# Patient Record
Sex: FEMALE | Race: WHITE | NOT HISPANIC OR LATINO | Employment: OTHER | URBAN - METROPOLITAN AREA
[De-identification: names, ages, dates, MRNs, and addresses within clinical notes are randomized per-mention and may not be internally consistent; named-entity substitution may affect disease eponyms.]

---

## 2021-04-08 DIAGNOSIS — Z23 ENCOUNTER FOR IMMUNIZATION: ICD-10-CM

## 2022-06-19 ENCOUNTER — APPOINTMENT (EMERGENCY)
Dept: RADIOLOGY | Facility: HOSPITAL | Age: 72
DRG: 563 | End: 2022-06-19
Payer: COMMERCIAL

## 2022-06-19 ENCOUNTER — HOSPITAL ENCOUNTER (INPATIENT)
Facility: HOSPITAL | Age: 72
LOS: 1 days | Discharge: HOME | DRG: 563 | End: 2022-06-21
Attending: SURGERY | Admitting: SURGERY
Payer: COMMERCIAL
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
## 2022-06-19 DIAGNOSIS — S42.292A FRACTURE OF HUMERAL HEAD, CLOSED, LEFT, INITIAL ENCOUNTER: Primary | ICD-10-CM

## 2022-06-19 DIAGNOSIS — R93.3 ABNORMAL CT SCAN, STOMACH: ICD-10-CM

## 2022-06-19 DIAGNOSIS — R93.89 ABNORMAL CT SCAN: ICD-10-CM

## 2022-06-19 LAB
ABO GROUP BLD: NORMAL
ABO GROUP BLD: NORMAL
ANION GAP SERPL CALCULATED.3IONS-SCNC: 5 MMOL/L (ref 4–13)
APTT PPP: 24 SECONDS (ref 23–37)
BASOPHILS # BLD AUTO: 0.05 THOUSANDS/ΜL (ref 0–0.1)
BASOPHILS NFR BLD AUTO: 0 % (ref 0–1)
BLD GP AB SCN SERPL QL: NEGATIVE
BUN SERPL-MCNC: 17 MG/DL (ref 5–25)
CALCIUM SERPL-MCNC: 8.2 MG/DL (ref 8.3–10.1)
CFFMA (FUNCTIONAL FIBRINOGEN MAX AMPLITUDE): 32.6 MM (ref 15–32)
CHLORIDE SERPL-SCNC: 108 MMOL/L (ref 100–108)
CK MB SERPL-MCNC: 1.5 % (ref 0–2.5)
CK MB SERPL-MCNC: 2.8 NG/ML (ref 0–5)
CK SERPL-CCNC: 184 U/L (ref 26–192)
CKLY30: 1 % (ref 0–2.6)
CKR(REACTION TIME): 6.2 MIN (ref 4.6–9.1)
CO2 SERPL-SCNC: 28 MMOL/L (ref 21–32)
CREAT SERPL-MCNC: 0.69 MG/DL (ref 0.6–1.3)
CRTMA(RAPIDTEG MAX AMPLITUDE): 68.1 MM (ref 52–70)
EOSINOPHIL # BLD AUTO: 0.01 THOUSAND/ΜL (ref 0–0.61)
EOSINOPHIL NFR BLD AUTO: 0 % (ref 0–6)
ERYTHROCYTE [DISTWIDTH] IN BLOOD BY AUTOMATED COUNT: 12 % (ref 11.6–15.1)
GFR SERPL CREATININE-BSD FRML MDRD: 89 ML/MIN/1.73SQ M
GLUCOSE SERPL-MCNC: 121 MG/DL (ref 65–140)
HCT VFR BLD AUTO: 41.5 % (ref 34.8–46.1)
HGB BLD-MCNC: 13.4 G/DL (ref 11.5–15.4)
HOLD SPECIMEN: NORMAL
IMM GRANULOCYTES # BLD AUTO: 0.15 THOUSAND/UL (ref 0–0.2)
IMM GRANULOCYTES NFR BLD AUTO: 1 % (ref 0–2)
INR PPP: 1.02 (ref 0.84–1.19)
LYMPHOCYTES # BLD AUTO: 1.55 THOUSANDS/ΜL (ref 0.6–4.47)
LYMPHOCYTES NFR BLD AUTO: 9 % (ref 14–44)
MCH RBC QN AUTO: 30.7 PG (ref 26.8–34.3)
MCHC RBC AUTO-ENTMCNC: 32.3 G/DL (ref 31.4–37.4)
MCV RBC AUTO: 95 FL (ref 82–98)
MONOCYTES # BLD AUTO: 0.79 THOUSAND/ΜL (ref 0.17–1.22)
MONOCYTES NFR BLD AUTO: 5 % (ref 4–12)
NEUTROPHILS # BLD AUTO: 14.66 THOUSANDS/ΜL (ref 1.85–7.62)
NEUTS SEG NFR BLD AUTO: 85 % (ref 43–75)
NRBC BLD AUTO-RTO: 0 /100 WBCS
PLATELET # BLD AUTO: 281 THOUSANDS/UL (ref 149–390)
PMV BLD AUTO: 10.5 FL (ref 8.9–12.7)
POTASSIUM SERPL-SCNC: 3.5 MMOL/L (ref 3.5–5.3)
PROTHROMBIN TIME: 13 SECONDS (ref 11.6–14.5)
RBC # BLD AUTO: 4.36 MILLION/UL (ref 3.81–5.12)
RH BLD: POSITIVE
RH BLD: POSITIVE
SODIUM SERPL-SCNC: 141 MMOL/L (ref 136–145)
SPECIMEN EXPIRATION DATE: NORMAL
WBC # BLD AUTO: 17.21 THOUSAND/UL (ref 4.31–10.16)

## 2022-06-19 PROCEDURE — 73030 X-RAY EXAM OF SHOULDER: CPT

## 2022-06-19 PROCEDURE — 36415 COLL VENOUS BLD VENIPUNCTURE: CPT | Performed by: EMERGENCY MEDICINE

## 2022-06-19 PROCEDURE — 80048 BASIC METABOLIC PNL TOTAL CA: CPT | Performed by: SURGERY

## 2022-06-19 PROCEDURE — 99205 OFFICE O/P NEW HI 60 MIN: CPT | Performed by: SURGERY

## 2022-06-19 PROCEDURE — 85610 PROTHROMBIN TIME: CPT | Performed by: SURGERY

## 2022-06-19 PROCEDURE — 90715 TDAP VACCINE 7 YRS/> IM: CPT | Performed by: EMERGENCY MEDICINE

## 2022-06-19 PROCEDURE — 99285 EMERGENCY DEPT VISIT HI MDM: CPT

## 2022-06-19 PROCEDURE — 82553 CREATINE MB FRACTION: CPT | Performed by: EMERGENCY MEDICINE

## 2022-06-19 PROCEDURE — NC001 PR NO CHARGE: Performed by: EMERGENCY MEDICINE

## 2022-06-19 PROCEDURE — 85397 CLOTTING FUNCT ACTIVITY: CPT | Performed by: SURGERY

## 2022-06-19 PROCEDURE — 86900 BLOOD TYPING SEROLOGIC ABO: CPT | Performed by: SURGERY

## 2022-06-19 PROCEDURE — 72125 CT NECK SPINE W/O DYE: CPT

## 2022-06-19 PROCEDURE — 96374 THER/PROPH/DIAG INJ IV PUSH: CPT

## 2022-06-19 PROCEDURE — 86850 RBC ANTIBODY SCREEN: CPT | Performed by: SURGERY

## 2022-06-19 PROCEDURE — 74177 CT ABD & PELVIS W/CONTRAST: CPT

## 2022-06-19 PROCEDURE — 85730 THROMBOPLASTIN TIME PARTIAL: CPT | Performed by: SURGERY

## 2022-06-19 PROCEDURE — 85576 BLOOD PLATELET AGGREGATION: CPT | Performed by: SURGERY

## 2022-06-19 PROCEDURE — 71260 CT THORAX DX C+: CPT

## 2022-06-19 PROCEDURE — 86901 BLOOD TYPING SEROLOGIC RH(D): CPT | Performed by: SURGERY

## 2022-06-19 PROCEDURE — 85347 COAGULATION TIME ACTIVATED: CPT | Performed by: SURGERY

## 2022-06-19 PROCEDURE — 85384 FIBRINOGEN ACTIVITY: CPT | Performed by: SURGERY

## 2022-06-19 PROCEDURE — 82550 ASSAY OF CK (CPK): CPT | Performed by: EMERGENCY MEDICINE

## 2022-06-19 PROCEDURE — 93308 TTE F-UP OR LMTD: CPT | Performed by: SURGERY

## 2022-06-19 PROCEDURE — 85025 COMPLETE CBC W/AUTO DIFF WBC: CPT | Performed by: SURGERY

## 2022-06-19 PROCEDURE — 76705 ECHO EXAM OF ABDOMEN: CPT | Performed by: SURGERY

## 2022-06-19 PROCEDURE — 70450 CT HEAD/BRAIN W/O DYE: CPT

## 2022-06-19 RX ORDER — OXYCODONE HYDROCHLORIDE 5 MG/1
5 TABLET ORAL EVERY 4 HOURS PRN
Status: DISCONTINUED | OUTPATIENT
Start: 2022-06-19 | End: 2022-06-21 | Stop reason: HOSPADM

## 2022-06-19 RX ORDER — POLYETHYLENE GLYCOL 3350 17 G/17G
17 POWDER, FOR SOLUTION ORAL DAILY PRN
Status: DISCONTINUED | OUTPATIENT
Start: 2022-06-19 | End: 2022-06-21 | Stop reason: HOSPADM

## 2022-06-19 RX ORDER — LIDOCAINE 50 MG/G
1 PATCH TOPICAL ONCE
Status: DISCONTINUED | OUTPATIENT
Start: 2022-06-19 | End: 2022-06-21 | Stop reason: HOSPADM

## 2022-06-19 RX ORDER — DOCUSATE SODIUM 100 MG/1
100 CAPSULE, LIQUID FILLED ORAL 2 TIMES DAILY
Status: DISCONTINUED | OUTPATIENT
Start: 2022-06-19 | End: 2022-06-21 | Stop reason: HOSPADM

## 2022-06-19 RX ORDER — AMOXICILLIN 250 MG
1 CAPSULE ORAL
Status: DISCONTINUED | OUTPATIENT
Start: 2022-06-19 | End: 2022-06-21 | Stop reason: HOSPADM

## 2022-06-19 RX ORDER — HYDROMORPHONE HCL IN WATER/PF 6 MG/30 ML
0.2 PATIENT CONTROLLED ANALGESIA SYRINGE INTRAVENOUS EVERY 4 HOURS PRN
Status: DISCONTINUED | OUTPATIENT
Start: 2022-06-19 | End: 2022-06-21 | Stop reason: HOSPADM

## 2022-06-19 RX ORDER — ACETAMINOPHEN 325 MG/1
975 TABLET ORAL EVERY 8 HOURS SCHEDULED
Status: DISCONTINUED | OUTPATIENT
Start: 2022-06-19 | End: 2022-06-21 | Stop reason: HOSPADM

## 2022-06-19 RX ORDER — ONDANSETRON 2 MG/ML
INJECTION INTRAMUSCULAR; INTRAVENOUS CODE/TRAUMA/SEDATION MEDICATION
Status: COMPLETED | OUTPATIENT
Start: 2022-06-19 | End: 2022-06-19

## 2022-06-19 RX ORDER — OXYCODONE HYDROCHLORIDE 5 MG/1
2.5 TABLET ORAL EVERY 4 HOURS PRN
Status: DISCONTINUED | OUTPATIENT
Start: 2022-06-19 | End: 2022-06-21 | Stop reason: HOSPADM

## 2022-06-19 RX ORDER — FENTANYL CITRATE 50 UG/ML
3 INJECTION, SOLUTION INTRAMUSCULAR; INTRAVENOUS ONCE
Status: COMPLETED | OUTPATIENT
Start: 2022-06-19 | End: 2022-06-19

## 2022-06-19 RX ORDER — ONDANSETRON 2 MG/ML
4 INJECTION INTRAMUSCULAR; INTRAVENOUS EVERY 4 HOURS PRN
Status: DISCONTINUED | OUTPATIENT
Start: 2022-06-19 | End: 2022-06-21 | Stop reason: HOSPADM

## 2022-06-19 RX ADMIN — TETANUS TOXOID, REDUCED DIPHTHERIA TOXOID AND ACELLULAR PERTUSSIS VACCINE, ADSORBED 0.5 ML: 5; 2.5; 8; 8; 2.5 SUSPENSION INTRAMUSCULAR at 17:33

## 2022-06-19 RX ADMIN — ACETAMINOPHEN 975 MG: 325 TABLET, FILM COATED ORAL at 17:33

## 2022-06-19 RX ADMIN — ONDANSETRON 4 MG: 2 INJECTION INTRAMUSCULAR; INTRAVENOUS at 08:23

## 2022-06-19 RX ADMIN — IOHEXOL 65 ML: 350 INJECTION, SOLUTION INTRAVENOUS at 08:37

## 2022-06-19 RX ADMIN — DOCUSATE SODIUM 100 MG: 100 CAPSULE, LIQUID FILLED ORAL at 11:25

## 2022-06-19 RX ADMIN — HYDROMORPHONE HYDROCHLORIDE 0.2 MG: 0.2 INJECTION, SOLUTION INTRAMUSCULAR; INTRAVENOUS; SUBCUTANEOUS at 12:11

## 2022-06-19 RX ADMIN — HYDROMORPHONE HYDROCHLORIDE 0.2 MG: 0.2 INJECTION, SOLUTION INTRAMUSCULAR; INTRAVENOUS; SUBCUTANEOUS at 19:49

## 2022-06-19 RX ADMIN — OXYCODONE HYDROCHLORIDE 5 MG: 5 TABLET ORAL at 17:33

## 2022-06-19 RX ADMIN — OXYCODONE HYDROCHLORIDE 5 MG: 5 TABLET ORAL at 11:25

## 2022-06-19 NOTE — QUICK NOTE
Chart reviewed  Patient admitted as a trauma after fall  GI consulted for abnormal CT scan showing fluid filled and distended esophagus and stomach  Will discuss with patient tomorrow regarding possible EGD for further evaluation  Keep NPO at midnight  Full consultation tomorrow

## 2022-06-19 NOTE — ED PROCEDURE NOTE
Procedure  POC AAA US    Date/Time: 6/19/2022 1:38 PM  Performed by: Patrick Moreno MD  Authorized by: Patrick Moreno MD     Patient location:  ED  Performing Provider:  Resident  Other Assisting Provider: No    Procedure details:     Exam Type:  Educational    Image quality: limited diagnostic      Image availability:  Images available in PACS and still images obtained  Findings:     Abdominal Aorta Findings: limited visualization of suprarenal aorta and limited visualization of infrarenal aorta    Interpretation:     Aortic ultrasound impression: indeterminate    Comments:      Significant bowel gas, difficult visualization                     Patrick Moreno MD  06/19/22 1753

## 2022-06-19 NOTE — ED PROVIDER NOTES
Emergency Department Airway Evaluation and Management Form    History  Obtained from: ems and patient  Patient has no allergy information on record  No chief complaint on file  HPI   Left dog out last night got knocked over and pt was on ground outside all night Pt is on eloquis  Pt co L shoulder pain   No past medical history on file  No past surgical history on file  No family history on file  I have reviewed and agree with the history as documented      Review of Systems  Unable secondary to acuity  Physical Exam  /60   Pulse 92   Temp 97 5 °F (36 4 °C) (Oral)   Resp (!) 0   SpO2 96%     Physical Exam  Alert nad   gcs 15 neck in collar  L shoulder tender not moving good pulses  abd nontender   Moving all other ext except L upper   ED Medications  Medications   ondansetron (ZOFRAN) injection (4 mg Intravenous Given 6/19/22 0823)   fentanyl citrate (PF) (FOR EMS ONLY) 100 mcg/2 mL injection 300 mcg (0 mcg Does not apply Given to EMS 6/19/22 0826)       Intubation  Procedures    Notes  Pt placed on 4 L of O2    Final Diagnosis  Final diagnoses:   None    fall on eloquis   L shoulder injury    ED Provider  Electronically Signed by     Josefa Gutierrez MD  06/19/22 3271

## 2022-06-19 NOTE — H&P
H&P - Trauma   Fermin Woo 73 y o  female MRN: [id number]  Unit/Bed#: [facility] Encounter: [id number]    Trauma Alert: Level B   Model of Arrival: Ambulance    Trauma Team: Attending Paulo Irizarry and Residents Clem Cheung  Consultants:     Orthopedics: routine consult; Epic consult order placed; Assessment/Plan   Active Problems / Assessment:   Comminuted proximal left humeral fracture  Distended, fluid filled stomach with reflux  Ground level fall, on Eloquis     Plan:   Trauma admission  Ortho consult  GI consult  Pain control  PT/OT   Tertiary within 24 hours    History of Present Illness     Chief Complaint: Fall  Mechanism:Fall     HPI:    Fermin Woo is a 73 y o  female who presents as a level B trauma after a fall  Patient is on eloquis for PE  Patient states that around midnight, she went to let her dog out, her dog was "spooked" by a racoon, pulled her forwards and she fell, landing on her L shoulder  Due to pain, she was unable to get up and was on the ground for approximately 7 hours  Per EMS, she was found to be 94 degrees F temporal  At time of evaluation, patient states that she feels very cold and is complaining of L shoulder pain  Initial temp was 97 degrees oral      Review of Systems   Constitutional: Negative for chills and fever  Respiratory: Negative for shortness of breath  Cardiovascular: Negative for chest pain  Genitourinary: Negative for flank pain  Musculoskeletal: Positive for gait problem  Negative for back pain and neck pain  Neurological: Negative for dizziness, weakness, light-headedness, numbness and headaches  12-point, complete review of systems was reviewed and negative except as stated above  Historical Information     No past medical history on file  No past surgical history on file  There is no immunization history on file for this patient  Last Tetanus: Given today 6/19/2022  Family History: Non-contributory    1   Before the illness or injury that brought you to the Emergency, did you need someone to help you on a regular basis? 0=No   2  Since the illness or injury that brought you to the Emergency, have you needed more help than usual to take care of yourself? 0=No   3  Have you been hospitalized for one or more nights during the past 6 months (excluding a stay in the Emergency Department)? 0=No   4  In general, do you see well? 0=Yes   5  In general, do you have serious problems with your memory? 0=No   6  Do you take more than three different medications everyday?  0=No   TOTAL   0     Did you order a geriatric consult if the score was 2 or greater?: n/a     Meds/Allergies   all current active meds have been reviewed and current meds:   Current Facility-Administered Medications   Medication Dose Route Frequency    acetaminophen (TYLENOL) tablet 975 mg  975 mg Oral Q8H [facility]    docusate sodium (COLACE) capsule 100 mg  100 mg Oral BID    HYDROmorphone HCl (DILAUDID) injection 0 2 mg  0 2 mg Intravenous Q4H PRN    naloxone (NARCAN) 0 04 mg/mL syringe 0 04 mg  0 04 mg Intravenous Q1MIN PRN    ondansetron (ZOFRAN) injection 4 mg  4 mg Intravenous Q4H PRN    oxyCODONE (ROXICODONE) IR tablet 2 5 mg  2 5 mg Oral Q4H PRN    oxyCODONE (ROXICODONE) IR tablet 5 mg  5 mg Oral Q4H PRN    polyethylene glycol (MIRALAX) packet 17 g  17 g Oral Daily PRN    senna-docusate sodium (SENOKOT S) 8 6-50 mg per tablet 1 tablet  1 tablet Oral HS      No Known Allergies    Objective   Initial Vitals:   Temperature: 97 5 °F (36 4 °C) (06/19/22 0815)  Pulse: 90 (06/19/22 0815)  Respirations: 16 (06/19/22 0815)  Blood Pressure: 130/60 (06/19/22 0815)    Primary Survey:   Airway:        Status: patent;        Pre-hospital Interventions: none        Hospital Interventions: none  Breathing:        Pre-hospital Interventions: oxygen       Effort: normal       Right breath sounds: normal       Left breath sounds: normal  Circulation:        Rhythm: regular       Rate: regular   Right Pulses Left Pulses    R radial: 2+  R femoral: 2+  R pedal: 2+     L radial: 2+  L femoral: 2+  L pedal: 2+       Disability:        GCS: Eye: 4; Verbal: 5 Motor: 6 Total: 15       Right Pupil: 4 mm;  round;  reactive         Left Pupil:  4 mm;  round;  reactive      R Motor Strength L Motor Strength    R : 5/5  R dorsiflex: 5/5  R plantarflex: 5/5 L : 5/5  L dorsiflex: 5/5  L plantarflex: 5/5        Sensory:  No sensory deficit  Exposure:       Completed: Yes      Secondary Survey:  Physical Exam  Vitals and nursing note reviewed  Constitutional:       General: She is not in acute distress  Appearance: She is not ill-appearing or toxic-appearing  HENT:      Head: Normocephalic and atraumatic  Right Ear: Tympanic membrane, ear canal and external ear normal       Left Ear: Tympanic membrane, ear canal and external ear normal       Nose: Nose normal       Mouth/Throat:      Mouth: Mucous membranes are moist       Pharynx: Oropharynx is clear  Eyes:      Extraocular Movements: Extraocular movements intact  Pupils: Pupils are equal, round, and reactive to light  Neck:      Comments: No midline C/T/L spine tenderness  Cardiovascular:      Rate and Rhythm: Normal rate and regular rhythm  Pulses: Normal pulses  Heart sounds: Normal heart sounds  Pulmonary:      Effort: Pulmonary effort is normal  No respiratory distress  Breath sounds: Normal breath sounds  Abdominal:      General: Abdomen is flat  Palpations: Abdomen is soft  Tenderness: There is no abdominal tenderness  There is no guarding or rebound  Musculoskeletal:         General: Tenderness (L shoulder) present  No deformity  Cervical back: Normal range of motion and neck supple  Comments: LUE held in abduction and internal rotation  NVI  Skin:     General: Skin is cool  Capillary Refill: Capillary refill takes less than 2 seconds     Neurological:      General: No focal deficit present  Mental Status: She is alert and oriented to person, place, and time  Invasive Devices  Report    Peripheral Intravenous Line  Duration           Peripheral IV Right Antecubital -- days    Peripheral IV Right Forearm -- days              Lab Results:   Results: I have personally reviewed all pertinent laboratory/tests results, BMP/CMP:   Lab Results   Component Value Date    SODIUM 141 06/19/2022    K 3 5 06/19/2022     06/19/2022    CO2 28 06/19/2022    BUN 17 06/19/2022    CREATININE 0 69 06/19/2022    CALCIUM 8 2 (L) 06/19/2022    EGFR 89 06/19/2022   , CBC:   Lab Results   Component Value Date    WBC 17 21 (H) 06/19/2022    HGB 13 4 06/19/2022    HCT 41 5 06/19/2022    MCV 95 06/19/2022     06/19/2022    MCH 30 7 06/19/2022    MCHC 32 3 06/19/2022    RDW 12 0 06/19/2022    MPV 10 5 06/19/2022    NRBC 0 06/19/2022    and CK:   Lab Results   Component Value Date    CKTOTAL 184 06/19/2022       Imaging Results: I have personally reviewed pertinent reports  and I have personally reviewed pertinent films in PACS  Chest Xray(s): positive for acute findings: Possible consolidation on L on my interpretation   FAST exam(s): negative for acute findings   CT Scan(s): positive for acute findings:   1  Displaced, comminuted proximal left humeral fracture  Recommend follow-up orthopedic surgery consultation      2  Distended, fluid-filled stomach with reflux into a dilated, abnormal appearing esophagus, up to the level of the thoracic inlet  Correlate for gastroparesis and achalasia  Patient is at increased risk for aspiration  Recommend follow-up GI   consultation      3  No traumatic solid or visceral organ injury      4   Mildly complex exophytic cyst upper pole left kidney  Follow-up renal ultrasound is recommended     Additional Xray(s): pending     Other Studies:     Code Status: No Order  Advance Directive and Living Will:      Power of :    POLST:

## 2022-06-19 NOTE — QUICK NOTE
Cervical Collar Clearance: The patient had a CT scan of the cervical spine demonstrating no acute injury  On exam, the patient had no midline point tenderness or paresthesias/numbness/weakness in the extremities  The patient had full range of motion (was then able to flex, extend, and rotate head laterally) without pain  There were no distracting injuries and the patient was not intoxicated  The patient's cervical spine was cleared radiologically and clinically  Cervical collar removed at this time       Mirlande Singletary DO  6/19/2022 10:46 AM

## 2022-06-19 NOTE — PROCEDURES
POC FAST US    Date/Time: 6/19/2022 8:59 AM  Performed by: Kandace Vanegas DO  Authorized by: Kandace Vanegas DO     Patient location:  Trauma  Procedure details:     Exam Type:  Diagnostic    Indications: blunt abdominal trauma and blunt chest trauma      Assess for:  Intra-abdominal fluid, pericardial effusion and pneumothorax    Technique: FAST      Views obtained:  Heart - Pericardial sac, RUQ - Fernandez's Pouch, LUQ - Splenorenal space and Suprapubic - Pouch of [place]    Image availability:  Images available in PACS  FAST Findings:     RUQ (Hepatorenal) free fluid: absent      LUQ (Splenorenal) free fluid: absent      Suprapubic free fluid: absent      Cardiac wall motion: identified      Pericardial effusion: absent    Interpretation:     Impressions: negative

## 2022-06-19 NOTE — CONSULTS
Orthopedics   Tarsunil Batters 73 y o  female MRN: [id number]  Unit/Bed#: [facility] 3      Chief Complaint:   left arm and shoulder pain    HPI:   73 y o  right hand dominant female who presented to [facility] this morning as a level B trauma after being found down by her neighbor this morning  She is complaining of left shoulder pain  She reports she had consumed alcohol last night and was letting her dog outside when the dog pulled her to the ground after chasing a racoon  She reports immediate pain to the left shoulder with an inability to get off the floor  She reports she lost consciousness but does not remember striking her head  She denies numbness or tingling to her left upper extremity  She denies any other complaints  She take Eliquis for a previous pulmonary embolism  Review Of Systems:   · Skin: Normal  · Neuro: See HPI  · Musculoskeletal: See HPI  · 14 point review of systems negative except as stated above     Past Medical History:   No past medical history on file  Past Surgical History:   No past surgical history on file  Family History:  Family history reviewed and non-contributory  No family history on file      Social History:  Social History     Socioeconomic History    Marital status: /Civil Union     Spouse name: Not on file    Number of children: Not on file    Years of education: Not on file    Highest education level: Not on file   Occupational History    Not on file   Tobacco Use    Smoking status: Not on file    Smokeless tobacco: Not on file   Substance and Sexual Activity    Alcohol use: Not on file    Drug use: Not on file    Sexual activity: Not on file   Other Topics Concern    Not on file   Social History Narrative    Not on file     Social Determinants of Health     Financial Resource Strain: Not on file   Food Insecurity: Not on file   Transportation Needs: Not on file   Physical Activity: Not on file   Stress: Not on file   Social Connections: Not on file Intimate Partner Violence: Not on file   Housing Stability: Not on file       Allergies:   No Known Allergies        Labs:  0   Lab Value Date/Time    HCT 41 5 06/19/2022 0822    HGB 13 4 06/19/2022 0822    INR 1 02 06/19/2022 0822    WBC 17 21 (H) 06/19/2022 0822       Meds:  No current facility-administered medications for this encounter  No current outpatient medications on file  Blood Culture:   No results found for: BLOODCX    Wound Culture:   No results found for: WOUNDCULT    Ins and Outs:  No intake/output data recorded  Physical Exam:   /57   Pulse 66   Temp 97 5 °F (36 4 °C) (Oral)   Resp 16   Wt 98 3 kg (216 lb 11 4 oz)   SpO2 97%   Gen: Alert and oriented to person, place, time  HEENT: EOMI, eyes clear, moist mucus membranes, hearing intact  Respiratory: Bilateral chest rise  No audible wheezing found  Cardiovascular: Regular Rate and Rhythm  Abdomen: soft nontender/nondistended  Musculoskeletal: left upper extremity  · Skin intact and swelling noted over the shoulder  · Tender to palpation over shoulder and upper arm  · Sensation intact to radial, ulna, median, musculocutaneous, and axillary nerve distributions  · Motor intact to  radial, ulna, median, musculocutaneous, and axillary nerve distributions  · 2+ rad pulse  · Left hand rings were removed upon exam     Radiology:   I personally reviewed the films  X-rays left humerus shows left proximal humerus fracture with glenohumeral location on the view that was obtained by the trauma team    CT scan of left upper extremity demonstrates comminuted proximal humerus fracture with [address] joint location      _*_*_*_*_*_*_*_*_*_*_*_*_*_*_*_*_*_*_*_*_*_*_*_*_*_*_*_*_*_*_*_*_*_*_*_*_*_*_*_*_*    Assessment:  73 y o  female S/P ground level with left proximal humerus fracture   The patient is neurovascularly intact and this injury can be managed nonoperatively at this time with follow up outpatient to discuss surgical options such as a rTSA or ORIF  Plan:   · NWB left upper extremity  Sling placed  · Analgesics for pain  · Will obtain new shoulder x-rays  · There is no height or weight on file to calculate BMI  · Recommend behavior modifications, nutrition and physical activity  · Dispo: Ortho will follow while admitted to trauma     · Patient can follow up outpatient with Dr Matteo Dimas   · Case seen in conjunction with senior resident on call      Daiana Fuentes MD

## 2022-06-19 NOTE — PROGRESS NOTES
Spiritual Care Progress Note    2022  Patient: Dolores See : 1950  Admission Date & Time: 2022 0814  MRN: [id number] [facility]: [id number]       responded to trauma alert per protocol   met with patient's son (first name Kaur Carbajal?) in family waiting room, provided support and oriented pt's son to hospital  Pt's son expressed gratitude, noted that patient had been walking his dog  Son reported pt's daughter Paul Mckeon en route to hospital      Spiritual care will remain available as needed  22 0800   Clinical Encounter Type   Visited With Family; Health care provider   Routine Visit Introduction   Crisis Visit ED

## 2022-06-20 ENCOUNTER — APPOINTMENT (INPATIENT)
Dept: RADIOLOGY | Facility: HOSPITAL | Age: 72
DRG: 563 | End: 2022-06-20
Payer: COMMERCIAL

## 2022-06-20 PROBLEM — S42.309A HUMERAL FRACTURE: Status: ACTIVE | Noted: 2022-06-20

## 2022-06-20 PROBLEM — R93.3 ABNORMAL CT SCAN, STOMACH: Status: ACTIVE | Noted: 2022-06-20

## 2022-06-20 PROBLEM — Z86.711 HISTORY OF PULMONARY EMBOLISM: Status: ACTIVE | Noted: 2022-06-20

## 2022-06-20 LAB
ANION GAP SERPL CALCULATED.3IONS-SCNC: 2 MMOL/L (ref 4–13)
BASOPHILS # BLD AUTO: 0.05 THOUSANDS/ΜL (ref 0–0.1)
BASOPHILS NFR BLD AUTO: 0 % (ref 0–1)
BUN SERPL-MCNC: 16 MG/DL (ref 5–25)
CALCIUM SERPL-MCNC: 8.9 MG/DL (ref 8.3–10.1)
CHLORIDE SERPL-SCNC: 105 MMOL/L (ref 100–108)
CO2 SERPL-SCNC: 28 MMOL/L (ref 21–32)
CREAT SERPL-MCNC: 0.78 MG/DL (ref 0.6–1.3)
EOSINOPHIL # BLD AUTO: 0.17 THOUSAND/ΜL (ref 0–0.61)
EOSINOPHIL NFR BLD AUTO: 1 % (ref 0–6)
ERYTHROCYTE [DISTWIDTH] IN BLOOD BY AUTOMATED COUNT: 12.3 % (ref 11.6–15.1)
GFR SERPL CREATININE-BSD FRML MDRD: 77 ML/MIN/1.73SQ M
GLUCOSE SERPL-MCNC: 102 MG/DL (ref 65–140)
HCT VFR BLD AUTO: 37.2 % (ref 34.8–46.1)
HGB BLD-MCNC: 12.8 G/DL (ref 11.5–15.4)
IMM GRANULOCYTES # BLD AUTO: 0.06 THOUSAND/UL (ref 0–0.2)
IMM GRANULOCYTES NFR BLD AUTO: 1 % (ref 0–2)
LYMPHOCYTES # BLD AUTO: 3.73 THOUSANDS/ΜL (ref 0.6–4.47)
LYMPHOCYTES NFR BLD AUTO: 30 % (ref 14–44)
MCH RBC QN AUTO: 32.7 PG (ref 26.8–34.3)
MCHC RBC AUTO-ENTMCNC: 34.4 G/DL (ref 31.4–37.4)
MCV RBC AUTO: 95 FL (ref 82–98)
MONOCYTES # BLD AUTO: 1.5 THOUSAND/ΜL (ref 0.17–1.22)
MONOCYTES NFR BLD AUTO: 12 % (ref 4–12)
NEUTROPHILS # BLD AUTO: 6.89 THOUSANDS/ΜL (ref 1.85–7.62)
NEUTS SEG NFR BLD AUTO: 56 % (ref 43–75)
NRBC BLD AUTO-RTO: 0 /100 WBCS
PLATELET # BLD AUTO: 276 THOUSANDS/UL (ref 149–390)
PMV BLD AUTO: 10.9 FL (ref 8.9–12.7)
POTASSIUM SERPL-SCNC: 4.1 MMOL/L (ref 3.5–5.3)
RBC # BLD AUTO: 3.92 MILLION/UL (ref 3.81–5.12)
SODIUM SERPL-SCNC: 135 MMOL/L (ref 136–145)
WBC # BLD AUTO: 12.4 THOUSAND/UL (ref 4.31–10.16)

## 2022-06-20 PROCEDURE — 36415 COLL VENOUS BLD VENIPUNCTURE: CPT | Performed by: EMERGENCY MEDICINE

## 2022-06-20 PROCEDURE — 99223 1ST HOSP IP/OBS HIGH 75: CPT | Performed by: INTERNAL MEDICINE

## 2022-06-20 PROCEDURE — 97163 PT EVAL HIGH COMPLEX 45 MIN: CPT

## 2022-06-20 PROCEDURE — NC001 PR NO CHARGE: Performed by: STUDENT IN AN ORGANIZED HEALTH CARE EDUCATION/TRAINING PROGRAM

## 2022-06-20 PROCEDURE — 80048 BASIC METABOLIC PNL TOTAL CA: CPT | Performed by: EMERGENCY MEDICINE

## 2022-06-20 PROCEDURE — 99222 1ST HOSP IP/OBS MODERATE 55: CPT | Performed by: ORTHOPAEDIC SURGERY

## 2022-06-20 PROCEDURE — 85025 COMPLETE CBC W/AUTO DIFF WBC: CPT | Performed by: EMERGENCY MEDICINE

## 2022-06-20 PROCEDURE — 73200 CT UPPER EXTREMITY W/O DYE: CPT

## 2022-06-20 PROCEDURE — 97167 OT EVAL HIGH COMPLEX 60 MIN: CPT

## 2022-06-20 PROCEDURE — 99232 SBSQ HOSP IP/OBS MODERATE 35: CPT | Performed by: STUDENT IN AN ORGANIZED HEALTH CARE EDUCATION/TRAINING PROGRAM

## 2022-06-20 PROCEDURE — 99254 IP/OBS CNSLTJ NEW/EST MOD 60: CPT | Performed by: INTERNAL MEDICINE

## 2022-06-20 RX ORDER — HEPARIN SODIUM 5000 [USP'U]/ML
5000 INJECTION, SOLUTION INTRAVENOUS; SUBCUTANEOUS EVERY 8 HOURS SCHEDULED
Status: DISCONTINUED | OUTPATIENT
Start: 2022-06-20 | End: 2022-06-20

## 2022-06-20 RX ORDER — ENOXAPARIN SODIUM 100 MG/ML
30 INJECTION SUBCUTANEOUS EVERY 12 HOURS SCHEDULED
Status: DISCONTINUED | OUTPATIENT
Start: 2022-06-20 | End: 2022-06-21 | Stop reason: HOSPADM

## 2022-06-20 RX ADMIN — OXYCODONE HYDROCHLORIDE 5 MG: 5 TABLET ORAL at 15:56

## 2022-06-20 RX ADMIN — ACETAMINOPHEN 975 MG: 325 TABLET, FILM COATED ORAL at 18:24

## 2022-06-20 RX ADMIN — DOCUSATE SODIUM AND SENNOSIDES 1 TABLET: 8.6; 5 TABLET ORAL at 21:42

## 2022-06-20 RX ADMIN — ACETAMINOPHEN 975 MG: 325 TABLET, FILM COATED ORAL at 10:21

## 2022-06-20 RX ADMIN — DOCUSATE SODIUM 100 MG: 100 CAPSULE, LIQUID FILLED ORAL at 10:21

## 2022-06-20 RX ADMIN — HYDROMORPHONE HYDROCHLORIDE 0.2 MG: 0.2 INJECTION, SOLUTION INTRAMUSCULAR; INTRAVENOUS; SUBCUTANEOUS at 03:07

## 2022-06-20 RX ADMIN — ACETAMINOPHEN 975 MG: 325 TABLET, FILM COATED ORAL at 01:56

## 2022-06-20 RX ADMIN — OXYCODONE HYDROCHLORIDE 5 MG: 5 TABLET ORAL at 01:53

## 2022-06-20 RX ADMIN — OXYCODONE HYDROCHLORIDE 5 MG: 5 TABLET ORAL at 21:45

## 2022-06-20 RX ADMIN — OXYCODONE HYDROCHLORIDE 5 MG: 5 TABLET ORAL at 06:29

## 2022-06-20 RX ADMIN — OXYCODONE HYDROCHLORIDE 5 MG: 5 TABLET ORAL at 10:21

## 2022-06-20 RX ADMIN — DOCUSATE SODIUM 100 MG: 100 CAPSULE, LIQUID FILLED ORAL at 18:24

## 2022-06-20 RX ADMIN — ENOXAPARIN SODIUM 30 MG: 30 INJECTION SUBCUTANEOUS at 21:41

## 2022-06-20 NOTE — PROGRESS NOTES
The in-patient order entered on 06/19/2022 at 10:17 a m  should not have been canceled  A clerical error occurred  The intent was for inpatient admission from that time forward

## 2022-06-20 NOTE — UTILIZATION REVIEW
Initial Clinical Review    Admission: Date/Time/Statement:   Admission Orders (From admission, onward)     Ordered        06/20/22 0447  Inpatient Admission  Once            06/19/22 1017  Inpatient Admission  Once,   Status:  Canceled                      Orders Placed This Encounter   Procedures    Inpatient Admission     Standing Status:   Standing     Number of Occurrences:   1     Order Specific Question:   Level of Care     Answer:   Med Surg [16]     Order Specific Question:   Estimated length of stay     Answer:   More than 2 Midnights     Order Specific Question:   Certification     Answer:   I certify that inpatient services are medically necessary for this patient for a duration of greater than two midnights  See H&P and MD Progress Notes for additional information about the patient's course of treatment  ED Arrival Information     Expected   -    Arrival   6/19/2022 08:14    Acuity   Emergent            Means of arrival   Ambulance    Escorted by   [facility]    Service   Trauma    Admission type   [address] complaint   Fall           No chief complaint on file  Initial Presentation: 73 y o  female presented to the ED as a level B trauma after a fall  Patient is on eloquis for PE  Patient states that around midnight, she went to let her dog out, her dog was "spooked" by a racoon, pulled her forwards and she fell, landing on her L shoulder  Due to pain, she was unable to get up and was on the ground for approximately 7 hours  Per EMS, she was found to be 94 degrees F temporal  Plan: inpatient admission for evaluation and treatment of fall while on Eliquis : Ortho consult, GI consult, pain control, PT/OT  Ortho consult: HEATHER BLAKE, sling placed  Will obtain new shoulder xrays  Date: 6/20   Day 2:     GI consult: will plan for upper endoscopy 6/21  Hold Eliquis for now  NPO after midnight   Will plan on performing in the supine position given acute displaced, comminuted proximal L humeral fracture  Continue PPI        ED Triage Vitals   Temperature Pulse Respirations Blood Pressure SpO2   06/19/22 0815 06/19/22 0815 06/19/22 0815 06/19/22 0815 06/19/22 0815   97 5 °F (36 4 °C) 90 16 130/60 94 %      Temp Source Heart Rate Source Patient Position - Orthostatic VS BP Location FiO2 (%)   06/19/22 0815 06/19/22 0815 06/19/22 0815 06/19/22 1948 --   Oral Monitor Lying Right arm       Pain Score       06/19/22 1125       10 - Worst Possible Pain          Wt Readings from Last 1 Encounters:   06/20/22 98 3 kg (216 lb 11 4 oz)     Additional Vital Signs:     Date/Time Temp Pulse Resp BP MAP (mmHg) SpO2 Calculated FIO2 (%) - Nasal Cannula Nasal Cannula O2 Flow Rate (L/min) O2 Device   06/20/22 14:40:39 -- -- 20 136/65 89 -- -- -- --   06/20/22 11:13:35 100 °F (37 8 °C) -- -- 140/66 91 -- -- -- --   06/20/22 06:33:57 99 6 °F (37 6 °C) 64 18 147/76 100 93 % -- -- --   06/20/22 0600 -- -- -- -- -- -- -- -- None (Room air)   06/20/22 0455 -- 66 18 175/74 Abnormal  -- 97 % 28 2 L/min None (Room air)   06/19/22 1948 -- 79 18 153/60 -- 98 % 28 2 L/min Nasal cannula   06/19/22 1715 -- 82 16 -- -- 97 % -- -- --   06/19/22 1230 -- 80 16 -- -- 98 % 28 2 L/min Nasal cannula   06/19/22 1115 -- 76 16 121/55 82 94 % -- -- --   06/19/22 1100 -- 72 16 120/59 -- 96 % -- -- --   06/19/22 1045 -- 66 16 116/58 -- 96 % -- -- --   06/19/22 1015 -- 74 16 135/66 -- 97 % -- -- --   06/19/22 1000 -- 68 16 120/59 -- 96 % -- -- --   06/19/22 0945 -- 66 16 120/57 -- 97 % -- -- --   06/19/22 0930 -- 82 16 137/67 -- 97 % -- -- --   06/19/22 0915 -- 78 16 140/66 -- 97 % -- -- --   06/19/22 0900 -- 88 16 161/63 -- 98 % -- -- --   06/19/22 0850 -- 90 18 174/71 Abnormal  -- 98 % -- -- --   06/19/22 0845 -- 96  16  136/67  -- 97 %  -- -- --   06/19/22 0830 -- 85 16 165/64 -- 98 % -- -- --   06/19/22 08:26:45 -- 85 16 165/64 -- 98 % -- -- Nasal cannula   06/19/22 0820 -- 92 0 Abnormal  -- -- 96 % -- -- -- Pertinent Labs/Diagnostic Test Results:   CT upper extremity wo contrast left   Final Result by Gabrielle Carlton MD (06/20 1010)   Impacted and displaced fracture of the surgical neck of the humerus      Comminuted and displaced fracture of the greater tuberosity      Displaced fracture of the lesser tuberosity along with marked rotation of the humeral head  There is marked glenohumeral joint subluxation with the shoulder effusion      Hemorrhage edema seen within the deltoid muscle      There is no definite head splitting humeral head fracture         Workstation performed: [id number]         XR shoulder 2+ vw left   Final Result by Cassie Rivera MD (06/20 1245)      Comminuted fracture humeral head and neck  This has been further evaluated with follow-up CT with reconstructed images  Workstation performed: [id number]         TRAUMA - CT head wo contrast   Final Result by Carolyn Corrales DO (06/19 4151)   Mild cerebral atrophy with chronic small vessel ischemic white matter disease  No acute intracranial abnormality  I personally discussed this study with Dr Julius Santos from trauma surgery on 6/19/2022 at 9:05 AM          Workstation performed: [id number]         TRAUMA - CT spine cervical wo contrast   Final Result by Carolyn Corrales DO (06/19 2131)   Degenerative changes of the cervical spine  No acute cervical spine fracture or traumatic malalignment  I personally discussed this study with Dr Julius Santos from trauma surgery on 6/19/2022 at 9:05 AM          Workstation performed: [id number]         TRAUMA - CT chest abdomen pelvis w contrast   Final Result by Carolyn Corrales DO (06/19 2091)   1  Displaced, comminuted proximal left humeral fracture  Recommend follow-up orthopedic surgery consultation  2   Distended, fluid-filled stomach with reflux into a dilated, abnormal appearing esophagus, up to the level of the thoracic inlet  Correlate for gastroparesis and achalasia  Patient is at increased risk for aspiration  Recommend follow-up GI    consultation  3   No traumatic solid or visceral organ injury  4   Mildly complex exophytic cyst upper pole left kidney  Follow-up renal ultrasound is recommended  I personally discussed this study with Dr Seamus Olvera from trauma surgery on 6/19/2022 at 9:05 AM                Workstation performed: [id number]         CT recon (no charge)   Final Result by Interface, Radiology Results In (06/19 0946)   Comminuted, mildly displaced, mildly impacted proximal left humeral fracture  Follow-up orthopedic surgery consultation recommended  Immediate reading was not called to the trauma team, given the above findings were seen on the earlier CT scan of the chest, abdomen and pelvis  The findings have not significantly changed  Workstation performed: [id number]         XR trauma multiple   Final Result by Dillan Chambers MD (06/19 2150)         1  Limited AP supine view of the chest demonstrating no gross acute intrathoracic process with particularly limited evaluation of the left lung  2   Comminuted fracture left proximal humerus                 Workstation performed: [id number]         XR chest 1 view    (Results Pending)   XR shoulder 1 vw left    (Results Pending)         Results from last 7 days   Lab Units 06/20/22  0646 06/19/22  0822   WBC Thousand/uL 12 40* 17 21*   HEMOGLOBIN g/dL 12 8 13 4   HEMATOCRIT % 37 2 41 5   PLATELETS Thousands/uL 276 281   NEUTROS ABS Thousands/µL 6 89 14 66*         Results from last 7 days   Lab Units 06/20/22  0523 06/19/22  0822   SODIUM mmol/L 135* 141   POTASSIUM mmol/L 4 1 3 5   CHLORIDE mmol/L 105 108   CO2 mmol/L 28 28   ANION GAP mmol/L 2* 5   BUN mg/dL 16 17   CREATININE mg/dL 0 78 0 69   EGFR ml/min/1 73sq m 77 89   CALCIUM mg/dL 8 9 8 2*             Results from last 7 days   Lab Units 06/20/22  0523 06/19/22  0822   GLUCOSE RANDOM mg/dL 102 121         Results from last 7 days   Lab Units 06/19/22  0822   CK TOTAL U/L 184   CK MB INDEX % 1 5   CK MB ng/mL 2 8             Results from last 7 days   Lab Units 06/19/22  0822   PROTIME seconds 13 0   INR  1 02   PTT seconds 24         ED Treatment:   Medication Administration from 06/19/2022 0805 to 06/20/2022 0552       Date/Time Order Dose Route Action     06/19/2022 0826 fentanyl citrate (PF) (FOR EMS ONLY) 100 mcg/2 mL injection 300 mcg 0 mcg Does not apply Given to EMS     06/19/2022 0823 ondansetron (ZOFRAN) injection 4 mg Intravenous Given     06/19/2022 0837 iohexol (OMNIPAQUE) 350 MG/ML injection (MULTI-DOSE) 65 mL 65 mL Intravenous Given     06/20/2022 0156 acetaminophen (TYLENOL) tablet 975 mg 975 mg Oral Given     06/19/2022 1733 acetaminophen (TYLENOL) tablet 975 mg 975 mg Oral Given     06/20/2022 0153 oxyCODONE (ROXICODONE) IR tablet 5 mg 5 mg Oral Given     06/19/2022 1733 oxyCODONE (ROXICODONE) IR tablet 5 mg 5 mg Oral Given     06/19/2022 1125 oxyCODONE (ROXICODONE) IR tablet 5 mg 5 mg Oral Given     06/20/2022 8672 HYDROmorphone HCl (DILAUDID) injection 0 2 mg 0 2 mg Intravenous Given     06/19/2022 1949 HYDROmorphone HCl (DILAUDID) injection 0 2 mg 0 2 mg Intravenous Given     06/19/2022 1211 HYDROmorphone HCl (DILAUDID) injection 0 2 mg 0 2 mg Intravenous Given     06/19/2022 1125 docusate sodium (COLACE) capsule 100 mg 100 mg Oral Given     06/19/2022 1733 tetanus-diphtheria-acellular pertussis (BOOSTRIX) IM injection 0 5 mL 0 5 mL Intramuscular Given        Past Medical History:   Diagnosis Date    GERD (gastroesophageal reflux disease)      Present on Admission:  **None**      Admitting Diagnosis: Abnormal CT scan [R93 89]  Fall, initial encounter [W19  XXXA]  Fracture of humeral head, closed, left, initial encounter [S42 292A]  Other injury of unspecified body region, initial encounter [T14  8XXA]  Age/Sex: 73 y o  female  Admission Orders:  Scheduled Medications:  acetaminophen, 975 mg, Oral, Q8H [facility]  docusate sodium, 100 mg, Oral, BID  enoxaparin, 30 mg, Subcutaneous, Q12H [facility]  lidocaine, 1 patch, Topical, Once  senna-docusate sodium, 1 tablet, Oral, HS      Continuous IV Infusions:     PRN Meds:  HYDROmorphone, 0 2 mg, Intravenous, Q4H PRN x2 6/19, x1 6/20 thus far  naloxone, 0 04 mg, Intravenous, Q1MIN PRN  ondansetron, 4 mg, Intravenous, Q4H PRN  oxyCODONE, 2 5 mg, Oral, Q4H PRN  oxyCODONE, 5 mg, Oral, Q4H PRN x2 6/19, x3 6/20 thus far  polyethylene glycol, 17 g, Oral, Daily PRN        IP CONSULT TO ORTHOPEDIC SURGERY  IP CONSULT TO CASE MANAGEMENT  IP CONSULT TO GASTROENTEROLOGY  IP CONSULT TO GERONTOLOGY    Network Utilization Review Department  ATTENTION: Please call with any questions or concerns to [phone] and carefully listen to the prompts so that you are directed to the right person  All voicemails are confidential   Violetta Cota all requests for admission clinical reviews, approved or denied determinations and any other requests to dedicated fax number below belonging to the campus where the patient is receiving treatment   List of dedicated fax numbers for the Facilities:  [address] DENIALS (Administrative/Medical Necessity) [phone]   [address] (Maternity/NICU/Pediatrics) [phone]   [address] [place] [address]  [phone]   Marsha Dawson [address] [facility] [place] [address] [id number] [place] [address] Gaurav [address] [facility] [address] [place] [address] [phone]

## 2022-06-20 NOTE — PLAN OF CARE
Problem: PHYSICAL THERAPY ADULT  Goal: Performs mobility at highest level of function for planned discharge setting  See evaluation for individualized goals  Description: Treatment/Interventions: Functional transfer training, LE strengthening/ROM, Elevations, Therapeutic exercise, Endurance training, Patient/family training, Equipment eval/education, Bed mobility, Gait training, Spoke to nursing, OT  Equipment Recommended:  (Continue to assess)       See flowsheet documentation for full assessment, interventions and recommendations  Note: Prognosis: Good  Problem List: Decreased strength, Decreased range of motion, Decreased endurance, Impaired balance, Decreased mobility, Pain, Orthopedic restrictions  Assessment: Pt is 73 y o  female seen for PT evaluation s/p admit to [facility] on 6/19/2022  Two pt identifiers were used to confirm  Pt presented s/p fall  Per H&P patient's daughter was supposed by raccoon and pulled her down  Patient was unable to get up and was on the ground for approximately 7 hours  Pt was admitted with a primary dx of:  Left humerus fracture, and other active problems including history of pulmonary embolism, abnormal CT scan, stomach  Per Ortho patient is NWB to LUE and non op management for left humerus fracture at this time  PT now consulted for assessment of mobility and d/c needs  Pt with Up in chair orders  Pts current co morbidities affecting treatment include:  No past medical history on file, and personal factors including steps to manage at home  Pts current clinical presentation is Unstable/ Unpredictable (high complexity) due to Ongoing medical management for primary dx, Decreased activity tolerance compared to baseline, Fall risk, Increased assistance needed from caregiver at current time, Current WBS, Continuous pulse oximetry monitoring      Upon evaluation, pt currently is requiring Min Ax1 for bed mobility; Min Ax1 for transfers and Min Ax1 for ambulation w/ HHA   Pt presents at PT eval functioning below baseline and currently w/ overall mobility deficits 2* to: BLE weakness, decreased ROM, impaired balance, decreased endurance, gait deviations, pain, decreased activity tolerance compared to baseline, decreased safety awareness, fall risk, orthopedic restrictions  Pt currently at a fall risk 2* to impairments listed above  Based on the aforementioned PT evaluation, pt will continue to benefit from skilled Acute PT interventions to address stated impairments; to maximize functional mobility; for ongoing pt/ family training; and DME needs  At conclusion of PT session pt returned onto stretcher with transport with phone and call bell within reach  Pt denies any further questions at this time  PT is currently recommending rehab vs home with OPPT pending pt progress and level of support available from pts spouse  PT will continue to follow during hospital stay  PT Discharge Recommendation: Post acute rehabilitation services (vs home with OPPT pending progress and level of support available to pt from spouse)          See flowsheet documentation for full assessment

## 2022-06-20 NOTE — DISCHARGE INSTRUCTIONS
Discharge Instructions - Orthopedics  Jovita Stevenson 73 y o  female MRN: [id number]  Unit/Bed#: [facility] 609-01    Weight Bearing Status:                                           Do not bear weight on your left shoulder and arm     DVT prophylaxis  None needed from an orthopaedic standpoint     Pain:  Continue analgesics as directed    Dressing Instructions:   Please keep clean, dry and intact until follow up     Appt Instructions: If you do not have your appointment, please call the clinic at [phone] to follow up with Dr Dennise Thomas on Monday the 27th to get scheduled for an shoulder replacement     Otherwise followup as scheduled     Contact the office sooner if you experience any increased numbness/tingling in the extremities        Miscellaneous:  ***

## 2022-06-20 NOTE — ASSESSMENT & PLAN NOTE
S/p fall, imaging demonstrated a displaced, comminuted proximal left humeral fracture  Non-op per ortho, NWB, sling  Eve pain protocol

## 2022-06-20 NOTE — PHYSICAL THERAPY NOTE
PHYSICAL THERAPY EVALUATION  NAME:  Anup Dunn  DATE: 22    AGE:   73 y o   Mrn:   [id number]  ADMIT DX:  Abnormal CT scan [R93 89]  Fall, initial encounter [W19  XXXA]  Fracture of humeral head, closed, left, initial encounter [S42 292A]  Other injury of unspecified body region, initial encounter [T14  8XXA]    Past Medical History:   Diagnosis Date    GERD (gastroesophageal reflux disease)        Past Surgical History:   Procedure Laterality Date     SECTION         Length Of Stay: 0    PHYSICAL THERAPY EVALUATION:       22 0855   Note Type   Note type Evaluation   Pain Assessment   Pain Assessment Tool 0-10   Pain Score 8   Pain Location/Orientation Orientation: Left; Location: Arm   Pain Onset/Description Onset: Ongoing;Frequency: Constant/Continuous; Descriptor: Aching   Effect of Pain on Daily Activities Increased pain with activity   Patient's Stated Pain Goal No pain   Hospital Pain Intervention(s) Ambulation/increased activity;Repositioned   Restrictions/Precautions   Weight Bearing Precautions Per Order Yes   LUE Weight Bearing Per Order (S)  NWB  (in sling)   Braces or Orthoses Sling  (LUE)   Other Precautions WBS; Cognitive; Chair Alarm; Bed Alarm;Multiple lines; Fall Risk;Pain   Home Living   Type of [address] Multi-level;Bed/bath upstairs  ([address], 5 steps up to main level)   Home Equipment [employer]   Additional Comments Pt reports living with a spouse who is able to assist as needed  Pt reports her spouse is retruning from a fishing trip today     Prior Function   Level of [place] Independent with ADLs and functional mobility   Lives With Spouse   Receives Help From Family   ADL Assistance Independent   Falls in the last 6 months 1 to 4   Comments Patient reports occasional use of a single-point cane for community distances prior to admission   General   Family/Caregiver Present No   Cognition   Arousal/Participation Alert   Attention Attends with cues to redirect   Orientation Level Oriented X4   Memory Within functional limits   Following Commands Follows one step commands without difficulty   RUE Assessment   RUE Assessment WFL   LUE Assessment   LUE Assessment X   RLE Assessment   RLE Assessment WFL   Strength RLE   RLE Overall Strength 4-/5   LLE Assessment   LLE Assessment WFL   Strength LLE   LLE Overall Strength 4-/5   Bed Mobility   Supine to Sit 4  Minimal assistance   Additional items Assist x 1; Increased time required;Verbal cues   Transfers   Sit to Stand 4  Minimal assistance   Additional items Assist x 1; Increased time required;Verbal cues   Stand to Sit 4  Minimal assistance   Additional items Assist x 1; Increased time required;Verbal cues   Additional Comments VC and TC needed for hand placement during transfers   Ambulation/Elevation   Gait pattern Excessively slow; Short stride; Foward flexed; Inconsistent christina   Gait Assistance 4  Minimal assist   Additional items Assist x 1   Assistive Device Other (Comment)  (HHA)   Distance 15ft   Balance   Static Sitting Fair -   Static Standing Poor +   Ambulatory Poor +   Endurance Deficit   Endurance Deficit Yes   Endurance Deficit Description fatigue, pain   Activity Tolerance   Activity Tolerance Patient limited by pain; Patient limited by fatigue   Medical Staff Made Aware Corey, OT; Oral Brent, OT student; OT present for co evaluation due to patient's current medical presentation and new physical limitations/restrictions which all impact patient's overall physical performance   Nurse Made Aware Patient appropriate to be seen and mobilized per nursing   Assessment   Prognosis Good   Problem List Decreased strength;Decreased range of motion;Decreased endurance; Impaired balance;Decreased mobility;Pain;Orthopedic restrictions   Assessment Pt is 73 y o  female seen for PT evaluation s/p admit to [facility] on 6/19/2022  Two pt identifiers were used to confirm  Pt presented s/p fall    Per H&P patient's daughter was supposed by raccoon and pulled her down  Patient was unable to get up and was on the ground for approximately 7 hours  Pt was admitted with a primary dx of:  Left humerus fracture, and other active problems including history of pulmonary embolism, abnormal CT scan, stomach  Per Ortho patient is NWB to LUE and non op management for left humerus fracture at this time  PT now consulted for assessment of mobility and d/c needs  Pt with Up in chair orders  Pts current co morbidities affecting treatment include:  No past medical history on file, and personal factors including steps to manage at home  Pts current clinical presentation is Unstable/ Unpredictable (high complexity) due to Ongoing medical management for primary dx, Decreased activity tolerance compared to baseline, Fall risk, Increased assistance needed from caregiver at current time, Current WBS, Continuous pulse oximetry monitoring     Upon evaluation, pt currently is requiring Min Ax1 for bed mobility; Min Ax1 for transfers and Min Ax1 for ambulation w/ HHA   Pt presents at PT eval functioning below baseline and currently w/ overall mobility deficits 2* to: BLE weakness, decreased ROM, impaired balance, decreased endurance, gait deviations, pain, decreased activity tolerance compared to baseline, decreased safety awareness, fall risk, orthopedic restrictions  Pt currently at a fall risk 2* to impairments listed above  Based on the aforementioned PT evaluation, pt will continue to benefit from skilled Acute PT interventions to address stated impairments; to maximize functional mobility; for ongoing pt/ family training; and DME needs  At conclusion of PT session pt returned onto stretcher with transport with phone and call bell within reach  Pt denies any further questions at this time  PT is currently recommending rehab vs home with OPPT pending pt progress and level of support available from pts spouse     PT will continue to follow during hospital stay  Goals   Patient Goals " to have less pain"   [facility] Expiration Date 06/30/22   Short Term Goal #1 In 10 days pt will complete: 1) Bed mobility skills with mod I to increase safety and independence as well as decrease caregiver burden  2) Functional transfers with mod I to promote increased independence, safety, and QOL  3) Ambulate 150' using least restrictive AD with mod I without LOB and stable vitals so that pt can negotiate previous living environment safely and promote independence with functional mobility and return to PLOF  4) Stair training up/ down 5 step/s using rail/s with mod I so that pt can enter/negotiate previous living environment safely and decrease fall risk  5) Improve balance grades by 1/2 grade to increase safety with all mobility and decrease fall risk  6) Improve BLE strength by 1/2 grade to help increase overall functional mobility and decrease fall risk  Plan   Treatment/Interventions Functional transfer training;LE strengthening/ROM; Elevations; Therapeutic exercise; Endurance training;Patient/family training;Equipment eval/education; Bed mobility;Gait training;Spoke to nursing;OT   PT Frequency Other (Comment)  (3-6x a week)   Recommendation   PT Discharge Recommendation Post acute rehabilitation services  (vs home with OPPT pending progress and level of support available to pt from spouse)   Equipment Recommended   (Continue to assess)   AM-PAC Basic Mobility Inpatient   Turning in Bed Without Bedrails 4   Lying on Back to Sitting on Edge of Flat Bed 3   Moving Bed to Chair 3   Standing Up From Chair 3   Walk in Room 3   Climb 3-5 Stairs 2   Basic Mobility Inpatient Raw Score 18   Basic Mobility Standardized Score 41 05   Highest Level Of Mobility   JH-HLM Goal 6: Walk 10 steps or more   JH-HLM Achieved 6: Walk 10 steps or more   Modified Winston Scale   Modified Winston Scale 4   Barthel Index   Feeding 5   Bathing 0   Grooming Score 5   Dressing Score 5   Bladder Score 10 Bowels Score 10   Toilet Use Score 10   Transfers (Bed/Chair) Score 10   Mobility (Level Surface) Score 0   Stairs Score 0   Barthel Index Score 55   Portions of the documentation may have been created using voice recognition software  Occasional wrong word or sound alike substitutions may have occurred due to the inherent limitations of the voice recognition software  Read the chart carefully and recognize, using context, where substitutions have occurred      Ofelia Number, PT, DPT

## 2022-06-20 NOTE — PLAN OF CARE
Problem: OCCUPATIONAL THERAPY ADULT  Goal: Performs self-care activities at highest level of function for planned discharge setting  See evaluation for individualized goals  Description: Treatment Interventions: ADL retraining, Endurance training, Compensatory technique education, Energy conservation, Activityengagement, Patient/family training, Equipment evaluation/education, Cognitive reorientation, Functional transfer training          See flowsheet documentation for full assessment, interventions and recommendations  Note: Limitation: Decreased ADL status, Decreased Safe judgement during ADL, Decreased cognition, Decreased self-care trans, Decreased high-level ADLs  Prognosis: Good  Assessment: Cedrick Carmona is a 72 yo female (RHD) who experienced a fall after being knocked over when taking out her dog and was down for prolonged period of time  Pt co L shoulder pain  CT scan showed an impacted and displaced fracture of the L surgical neck of the humerus, displaced fracture of L greater tuberosity, displaced fracture of L lesser tuberosity along with marked rotation of the L humeral head and glenohumeral joint subluxation  Pending outpatient surgical interventions  Pt is NWB LUE with sling  Pt does not have relevant PMH  Pt's prior level of function was I with ADLs/IADLs living in a two story home with her spouse  Currently, pt is overall Min A with ADLs and functional transfers/mobility with HHA  Pt's limitations include decreased balance, decreased activity activity tolerance, increased dizziness, pain, fatigue, WBS, and increased time to complete functional tasks  Pt was educated on WBS, importance of repositioning to increase activity tolerance, and compensatory techniques to assist in occupational performance of ADLs, IADLs, and functional transfers/mobility  The patient's raw score on the -PAC Daily Activity inpatient short form is 19, standardized score is 40 22, greater than 39 4   Patients at this level are likely to benefit from discharge to home  Please refer to the recommendation of the Occupational Therapist for safe discharge planning  OT recommends additional inpatient rehab services vs home with skilled outpatient OT services based on progress and available family support  OT will continue to address the following goals listed below  OT Discharge Recommendation: Post acute rehabilitation services (vs home with outpatient rehab services pending her progression and available family support)  OT - OK to Discharge:  Yes

## 2022-06-20 NOTE — CONSULTS
Consultation - Geriatric Medicine   Musa Trujillo 73 y o  female MRN: [id number]  [facility] Encounter: [id number]      Assessment/Plan     Ambulatory dysfunction with fall  -reported mechanical when pulled by dog at home   -(+) head strike (+) loss of consciousness  -injuries as outlined below  -does not use assist device for mobilization in the home, uses can outside the home  -hx recurrent falls due to balance issues, has not sustained traumatic injuries during prior falls  -high risk future falls due to age, hx fall, impaired vision, balance disorder, deconditioning/debility and unfamiliar environment   -encourage good body mechanics and assist with all transfers  -keep personal items and call bell close to prevent reaching  -maintain environment free of fall hazards  -encourage appropriate footwear and adequate lighting at all times when out of bed  -discussed consideration of home fall risk assessment and personal fall alert system on returning home  -PT and OT pending     Left proximal humerus fracture  -s/p fall as above  -noted on admission imaging  -NWB LUE in sling  -continue acute pain control  -neurovascular checks per protocol  -Ortho following - no surg intervention anticipated during acute hospitalization - outpatient follow-up for further planning    Acute pain due to trauma   -continue pain control per Geriatric pain protocol:  Tylenol 975mg Q8H scheduled  Roxicodone 2 5mg Q4H PRN moderate pain  Roxicodone 5mg Q4H PRN severe pain  Dilaudid 0 2mg Q4H PRN  -continue adjuncts such including lidocaine patch topically  -encourage addition of non-pharmacologic pain treatment including ice and frequent repositioning  -recommend  bowel regimen to prevent and treat constipation due to increased risk with acute pain and opiate pain medications    Hx DVT with PE  -provoked PE following foot fracture   -remains on Eliquis chronically - consider risk vs benefit given risk recurrent falls    Abnormal CT scan with dysphagia and reflux  -GI following - tentative for EGD and possible dilation if indicated tomorrow  -GI request hold Eliquis for procedure    Cognitive screening   -alert and oriented, denies memory concerns  -reports mostly full independence with ADLs and IADLs requiring assistance only due to physical debilities related to balance trouble which she relates to hx acoustic neuroma  -CTH obtained on admission personally reviewed, mild chronic microangiopathic changes are noted  -consider TSH and B12 with routine labs if memory symptoms develop  -continue optimization chronic medical conditions  -encourage patient remain physically, socially, and cognitively active engaged maintain cognitive acuity    Anxiety  -managed non pharmacologically  -reports good psychosocial support with two daughters who are both nurses  -encourage close outpatient follow-up with PCP for ongoing management and early intervention if symptoms affecting day-to-day living  -continue supportive cares    Acoustic neuroma  -reported by patient   -o/p f/u with Nsx/Neuro as appropriate    Impaired Vision  -recommend use of corrective lenses at all appropriate times  -encourage adequate lighting and encourage use of assistance with ambulation  -keep personal belongings close to person to avoid reaching  -encourage appropriate footwear at all times  -consider large font for printed materials provided to patient    Delirium precautions  -Patient is high risk of delirium due to age, fall, traumatic injuries, acute pain, hospitalization  -Initiate delirium precautions  -maintain normal sleep/wake cycle  -minimize overnight interruptions, group overnight vitals/labs/nursing checks as possible  -dim lights, close blinds and turn off tv to minimize stimulation and encourage sleep environment in evenings  -ensure that pain is well controlled   -monitor for fecal and urinary retention which may precipitate delirium  -encourage early mobilization and ambulation with assistance once cleared to safely do so by primary service  -provide frequent reorientation and redirection as indicated appropriate  -minimize use of medications which may precipitate or worsen delirium such as tramadol, benzodiazepine, anticholinergics, and benadryl  -encourage hydration and nutrition   -redirect unwanted behaviors as first line    Deconditioning/debilty/frailty  -clinical frailty scale stage IV, vulnerable  -multifactorial including age, history of DVT with Pulmonary Embolism, occlusive normal and multiple additional chronic medical comorbidities now with fall and acute traumatic injury superimposed on elderly individual with limited physiologic and metabolic reserve  -encourage well-balanced nutrition, consider nutritional supplements between meals if oral intake is poor  -continue optimization chronic medical conditions and address acute derangements as arise  -ensure anxiety/mood/depression symptoms if present are adequately treated as may impact patient's response to therapies as well as overall sense of well-being and quality of life    Home medication review   Personally confirmed with patient at bedside:    Eliquis   Vit D supplement  Multivitamin    Care coordination: rounded with Missy Weiner (RN)    History of Present Illness   Physician Requesting Consult:  Miles Alegre,*  Reason for Consult / Principal Problem: Fall   Hx and PE limited by: N/A  Additional history obtained from: Chart review and patient evaluation, outside records including [facility], [address]     HPI: Tamiko Stone is a 73y o  year old female with acoustic neuroma, hx DVT with PE on eliquis balance disorder, and impaired vision who is admitted to the trauma service with ambulatory dysfunction and fall and found to have left proximal humerus fracture on admission imaging is being seen in consultation by Geriatrics for high risk developing delirium during hospitalization  She seen examined bedside where she is lying resting comfortably, she explains that the event occurred at home on 6/18/22 when she was taking her dog out and the dog spotted a racoon and tried to take off after it while the patient was holding the leash causing the patient to go flying out the door and landed on her left side  She reports head strike and brief loss of consciousness and immediate severe pain in her left arm unable to get up without assistance, she reports remaining on the ground outside for an estimated 7 hours before a neighbor heard her cries for help and alerted emergency services  On admission she was found to have left proximal humerus fracture and is being followed by Orthopedics, on admission imaging she was found to have abnormally dilated esophagus and stomach was was evaluated by GI who is planning for EGD tomorrow  Prior to hospitalization she was residing home with her  who is away at the time of fall  She reports being mostly independent with ADLs and IADLs intermittently requiring assistance with getting ready for the day if she is having exacerbation of balance issues which she relates to her acoustic neuroma  She does not use any assist device for ambulation in the house however does use cane for stability when outside of the home  She does report frequent falls or near falls none recently resulting in traumatic injury aside from that leading to current admission  She requires use of glasses, does not use hearing aids or dentures and denies memory concerns  Inpatient consult to Gerontology  Consult performed by: Vandana Madsen DO  Consult ordered by: Pb Neal DO        Review of Systems   Constitutional: Negative  Negative for chills and fever  HENT: Negative  Eyes: Positive for visual disturbance (wears glasses whcih were broken in fall )  Respiratory: Negative  Cardiovascular: Negative      Gastrointestinal: Negative  Endocrine: Negative  Genitourinary: Negative  Musculoskeletal: Positive for gait problem  Severe left arm pain   Skin: Negative  Neurological: Positive for dizziness (frequent ) and light-headedness (frequent )  Negative for headaches  Hematological: Negative  Does not bruise/bleed easily (despite being on eliquis)  Psychiatric/Behavioral: Negative  All other systems reviewed and are negative  Historical Information   Past Medical History:   Diagnosis Date    GERD (gastroesophageal reflux disease)      Past Surgical History:   Procedure Laterality Date     SECTION       Social History   Social History     Substance and Sexual Activity   Alcohol Use Yes    Comment: occassionally     Social History     Substance and Sexual Activity   Drug Use Never     Social History     Tobacco Use   Smoking Status Never Smoker   Smokeless Tobacco Never Used     Family History:   Family History   Problem Relation Age of Onset    Stroke Mother      Meds/Allergies   all current active meds have been reviewed    No Known Allergies    Objective   No intake or output data in the 24 hours ending 22 1039  Invasive Devices  Report    Peripheral Intravenous Line  Duration           Peripheral IV Right Antecubital -- days              Physical Exam  Vitals and nursing note reviewed  Constitutional:       General: She is not in acute distress  Appearance: Normal appearance  She is not ill-appearing  HENT:      Head: Normocephalic  Nose: Nose normal       Mouth/Throat:      Mouth: Mucous membranes are moist       Comments: Dentition intact  Eyes:      General: No scleral icterus  Right eye: No discharge  Left eye: No discharge  Conjunctiva/sclera: Conjunctivae normal       Comments: Pupils equal round   Neck:      Comments: Trachea midline, phonation normal  Cardiovascular:      Rate and Rhythm: Normal rate and regular rhythm     Pulmonary:      Effort: Pulmonary effort is normal  No respiratory distress  Breath sounds: No wheezing  Abdominal:      General: There is no distension  Palpations: Abdomen is soft  Tenderness: There is no abdominal tenderness  Musculoskeletal:      Cervical back: Neck supple  Right lower leg: No edema  Left lower leg: No edema  Comments: LUE in sling   Skin:     General: Skin is warm and dry  Neurological:      Mental Status: She is alert  Comments: Awake and alert, oriented, answers questions appropriately, speech clear fluent   Psychiatric:         Behavior: Behavior normal       Comments: Mood affect appropriate for circumstances       Lab Results:   I have personally reviewed pertinent lab results      I have personally reviewed the following imaging study reports in PACS:    6/19/22- CTH wo contrast, CT C-spine, CT chest/abd/pelvis, XR left shoulder    Therapies:   PT: pending   OT: pending     VTE Prophylaxis: Enoxaparin (Lovenox)    Code Status: Level 1 - Full Code  Advance Directive and Living Will:      Power of :    POLST:      Family and Social Support:   Living Arrangements: Lives w/ Spouse/significant other  Support Systems: Spouse/significant other; Children  Assistance Needed: to be assessed  Type of Current Residence: Private residence  Current [address] : No    Goals of Care: pain control

## 2022-06-20 NOTE — ASSESSMENT & PLAN NOTE
Correlate for gastroparesis and achalasia  GI consulted, appreciate recs  Plan for EGD tomorrow, 6/21  Regular diet for now, NPO after midnight     Holding Eliquis for possible dilation

## 2022-06-20 NOTE — PLAN OF CARE
Problem: PAIN - ADULT  Goal: Verbalizes/displays adequate comfort level or baseline comfort level  Description: Interventions:  - Encourage patient to monitor pain and request assistance  - Assess pain using appropriate pain scale  - Administer analgesics based on type and severity of pain and evaluate response  - Implement non-pharmacological measures as appropriate and evaluate response  - Consider cultural and social influences on pain and pain management  - Notify physician/advanced practitioner if interventions unsuccessful or patient reports new pain  Outcome: Progressing     Problem: INFECTION - ADULT  Goal: Absence or prevention of progression during hospitalization  Description: INTERVENTIONS:  - Assess and monitor for signs and symptoms of infection  - Monitor lab/diagnostic results  - Monitor all insertion sites, i e  indwelling lines, tubes, and drains  - Monitor endotracheal if appropriate and nasal secretions for changes in amount and color  - [place] appropriate cooling/warming therapies per order  - Administer medications as ordered  - Instruct and encourage patient and family to use good hand hygiene technique  - Identify and instruct in appropriate isolation precautions for identified infection/condition  Outcome: Progressing

## 2022-06-20 NOTE — PROGRESS NOTES
[facility]  Progress Note - Cayce Sick 1950, 73 y o  female MRN: [id number]  Unit/Bed#: [facility] 609-01 Encounter: [id number]  Primary Care Provider: Diana Mulligan MD   Date and time admitted to hospital: 6/19/2022  8:14 AM    Abnormal CT scan, stomach  Assessment & Plan  Correlate for gastroparesis and achalasia  GI consulted, appreciate recs  Plan for EGD tomorrow, 6/21  Regular diet for now, NPO after midnight  Holding Eliquis for possible dilation     History of pulmonary embolism  Assessment & Plan  Distant history of PE 5 years ago per patient  Attributed to trauma/ fracture  On eliquis at home - holding for EGD 6/21  [facility] for DVT ppx     * Humeral fracture  Assessment & Plan  S/p fall, imaging demonstrated a displaced, comminuted proximal left humeral fracture  Non-op per ortho, NWB, sling  Eve pain protocol      TERTIARY TRAUMA SURVEY NOTE    Prophylaxis: Heparin    Disposition: Pending PT/OT evaluation     Code status:  Level 1 - Full Code    Consultants: ortho, GI      SUBJECTIVE:     Transfer from: N/A  Mechanism of Injury:Fall    Chief Complaint: L arm pain     HPI/Last 24 hour events:  Patient was evaluated as a level B trauma after a fall  She was walking her dog when her dog saw a raccoon and her dog pulled her to the ground, landing on her left shoulder  Workup revealed a displaced, comminuted proximal left humeral fracture  Ortho was consulted and determined the fracture to be nonoperative  CT abdomen/pelvis also showed a fluid filled and distended stomach  GI was consulted who recommend EGD tomorrow  This morning, she complains of left arm pain  She denies any new aches or pains  No fevers or chills  She is tolerating a diet without nausea or vomiting  She is having bowel function      Active medications:           Current Facility-Administered Medications:     acetaminophen (TYLENOL) tablet 975 mg, 975 mg, Oral, Q8H LORENE, 975 mg at 06/20/22 0156    docusate sodium (COLACE) capsule 100 mg, 100 mg, Oral, BID, 100 mg at 06/19/22 1125    heparin (porcine) subcutaneous injection 5,000 Units, 5,000 Units, Subcutaneous, Q8H [address]    HYDROmorphone HCl (DILAUDID) injection 0 2 mg, 0 2 mg, Intravenous, Q4H PRN, 0 2 mg at 06/20/22 0307    lidocaine (LIDODERM) 5 % patch 1 patch, 1 patch, Topical, Once    naloxone (NARCAN) 0 04 mg/mL syringe 0 04 mg, 0 04 mg, Intravenous, Q1MIN PRN    ondansetron (ZOFRAN) injection 4 mg, 4 mg, Intravenous, Q4H PRN    oxyCODONE (ROXICODONE) IR tablet 2 5 mg, 2 5 mg, Oral, Q4H PRN    oxyCODONE (ROXICODONE) IR tablet 5 mg, 5 mg, Oral, Q4H PRN, 5 mg at 06/20/22 0629    polyethylene glycol (MIRALAX) packet 17 g, 17 g, Oral, Daily PRN    senna-docusate sodium (SENOKOT S) 8 6-50 mg per tablet 1 tablet, 1 tablet, Oral, HS      OBJECTIVE:     Vitals:   Vitals:    06/20/22 0633   BP: 147/76   Pulse: 64   Resp: 18   Temp: 99 6 °F (37 6 °C)   SpO2: 93%       Physical Exam:   NAD, alert and oriented x3  Normocephalic, no neck tenderness  MMM, EOMI  Norm resp effort on room air   Regular rate  LUE in sling  Abd soft, NT/ND  No calf tenderness or peripheral edema  Motor/sensation intact in distal extremities  CN grossly intact, GCS15  Skin is warm and dry        1  Before the illness or injury that brought you to the Emergency, did you need someone to help you on a regular basis? 0=No   2  Since the illness or injury that brought you to the Emergency, have you needed more help than usual to take care of yourself? 1=Yes   3  Have you been hospitalized for one or more nights during the past 6 months (excluding a stay in the Emergency Department)? 0=No   4  In general, do you see well? 0=Yes   5  In general, do you have serious problems with your memory? 0=No   6  Do you take more than three different medications everyday?  1=Yes   TOTAL   2     Did you order a geriatric consult if the score was 2 or greater?: yes                I/O: I/O       06/18 0701  06/19 0700 06/19 0701  06/20 0700 06/20 0701  06/21 0700           Unmeasured Urine Occurrence  1 x           Invasive Devices: Invasive Devices  Report    Peripheral Intravenous Line  Duration           Peripheral IV Right Antecubital -- days                  Imaging:   XR shoulder 2+ vw left    Result Date: 6/20/2022  Impression: Comminuted fracture humeral head and neck  This has been further evaluated with follow-up CT with reconstructed images  Workstation performed: [id number]     TRAUMA - CT head wo contrast    Result Date: 6/19/2022  Impression: Mild cerebral atrophy with chronic small vessel ischemic white matter disease  No acute intracranial abnormality  I personally discussed this study with Dr Kerry Garcia from trauma surgery on 6/19/2022 at 9:05 AM  Workstation performed: [id number]     TRAUMA - CT spine cervical wo contrast    Result Date: 6/19/2022  Impression: Degenerative changes of the cervical spine  No acute cervical spine fracture or traumatic malalignment  I personally discussed this study with Dr Kerry Garcia from trauma surgery on 6/19/2022 at 9:05 AM  Workstation performed: [id number]     TRAUMA - CT chest abdomen pelvis w contrast    Result Date: 6/19/2022  Impression: 1  Displaced, comminuted proximal left humeral fracture  Recommend follow-up orthopedic surgery consultation  2   Distended, fluid-filled stomach with reflux into a dilated, abnormal appearing esophagus, up to the level of the thoracic inlet  Correlate for gastroparesis and achalasia  Patient is at increased risk for aspiration  Recommend follow-up GI consultation  3   No traumatic solid or visceral organ injury  4   Mildly complex exophytic cyst upper pole left kidney  Follow-up renal ultrasound is recommended    I personally discussed this study with Dr Kerry Garcia from trauma surgery on 6/19/2022 at 9:05 AM  Workstation performed: [id number]     XR trauma multiple    Result Date: 6/19/2022  Impression: 1  Limited AP supine view of the chest demonstrating no gross acute intrathoracic process with particularly limited evaluation of the left lung  2   Comminuted fracture left proximal humerus  Workstation performed: [id number]     CT recon (no charge)    Result Date: 6/19/2022  Impression: Comminuted, mildly displaced, mildly impacted proximal left humeral fracture  Follow-up orthopedic surgery consultation recommended  Immediate reading was not called to the trauma team, given the above findings were seen on the earlier CT scan of the chest, abdomen and pelvis  The findings have not significantly changed   Workstation performed: [id number]       Labs:   CBC:   Lab Results   Component Value Date    WBC 12 40 (H) 06/20/2022    HGB 12 8 06/20/2022    HCT 37 2 06/20/2022    MCV 95 06/20/2022     06/20/2022    MCH 32 7 06/20/2022    MCHC 34 4 06/20/2022    RDW 12 3 06/20/2022    MPV 10 9 06/20/2022    NRBC 0 06/20/2022     CMP:   Lab Results   Component Value Date     06/20/2022    CO2 28 06/20/2022    BUN 16 06/20/2022    CREATININE 0 78 06/20/2022    CALCIUM 8 9 06/20/2022    EGFR 77 06/20/2022     Coagulation: No results found for: PT, INR, APTT

## 2022-06-20 NOTE — ASSESSMENT & PLAN NOTE
Distant history of PE 5 years ago per patient  Attributed to trauma/ fracture     On eliquis at home - holding for EGD 6/21  [facility] for DVT ppx

## 2022-06-20 NOTE — CONSULTS
[facility] - Inpatient Consultation  Mason Sevilla 73 y o  female MRN: [id number]  Unit/Bed#: [facility] 609-01 Encounter: [id number]    Reason for Consult / Principal Problem:     Abnormal CT scan    ASSESSMENT & PLAN:      68-year-old female with history of pulmonary embolism on Eliquis, anxiety, reflux, and acoustic neuroma with dizziness who presented as trauma level B after fall  Initial workup with findings of abnormal appearance of the esophagus and stomach for which GI consultation was requested  1  Abnormal CT scan, dysphagia, reflux - findings of distended fluid-filled stomach with reflux and dilated, abnormal appearing esophagus up to the level of the thoracic inlet  Possibly secondary to underlying hiatal hernia  Patient does admit to dysphagia but only with her dry pills  Denies any dysphagia with food or liquids  Could be secondary to underlying stricture in the setting of suspected hiatal hernia  Patient denies any other chronic dysphagia symptoms  Low suspicion for achalasia or gastroparesis although not definitively excluded  Less likely to be malignancy  · Discussed further evaluation with endoscopy and patient is agreeable to proceed  · In the context of recent Eliquis use, will plan for upper endoscopy tomorrow, 6/21 in case any intervention such as dilation as needed  · Continue to hold Eliquis for now  · Okay for diet today from GI standpoint  · NPO past midnight for procedure tomorrow  · Will plan on performing procedure in the supine position given acute displaced, comminuted proximal left humeral fracture  · Anti-reflux measures  · Continue PPI  · Follow clinically  · Remainder of care per primary team    2  History of pulmonary embolism on Eliquis - last dose of Eliquis on evening of 6/18  Appears to have been provoked PE in the setting of prior fall with multiple injuries    · Continue to hold Eliquis for now  · If anticoagulation is necessary in the interim, okay to bridge with IV heparin  · Will defer decision to start IV heparin to primary team at their discretion  · If IV heparin is to be started, will need to be held 4-6 hours prior to procedure    3  Status post fall, displaced, comminuted proximal left humeral fracture - fracture noted on CT  Status post fall  · Non-operative management per orthopedics  · Nonweightbearing, sling  · Pain control per primary team  ______________________________________________________________________    HISTORY OF PRESENT ILLNESS:  46-year-old female with history of pulmonary embolism on Eliquis, anxiety, reflux, and acoustic neuroma with dizziness who presented as trauma level be after fall  Patient reportedly went to let her dog out when he was boot by a raccoon pulling on his lesion causing her to fall and land on her left shoulder  Due to severe pain, patient was unable to get up and was on the ground for approximately 7 hours  Patient was eventually found by EMS and brought in as trauma alert  As part of initial workup  CT imaging was obtained which showed abnormal appearance of the stomach and esophagus showing distension and filled with debris and fluid  GI consultation was requested for evaluation for abnormal CT findings  Patient reports that she has had some issues with dysphagia but only when swallowing her dry pills  This is a fairly recent issue over the last few weeks  She denies having any issues with solid foods or liquids  She did raise this concern to her primary physician who started her on omeprazole as outpatient  She reports that this has been controlling her reflux and heartburn symptoms although she continues to have some issues with pills  She reports remote history of endoscopy in the past for evaluation of right upper quadrant abdominal pain  She does not recall any significant findings  She has had recent colonoscopy in November of 2021 with some polyps removed    Denies unintentional weight loss  Denies any nausea or vomiting  She does not smoke  She does admit to alcohol consumption reporting the drink 1/2-1 beer a day or so  REVIEW OF SYSTEMS:    CONSTITUTIONAL: Denies any fever, chills, rigors, and weight loss  HEENT: No earache or tinnitus, denies hearing loss or visual disturbances  CARDIOVASCULAR: No chest pain or palpitations   RESPIRATORY: Denies any cough, hemoptysis, shortness of breath or dyspnea on exertion  GASTROINTESTINAL: As noted in the History of Present Illness   GENITOURINARY: No problems with urination, denies any hematuria or dysuria  NEUROLOGIC: Denies headaches   MUSCULOSKELETAL: Denies any muscle or joint pain   SKIN: Denies skin rashes or itching   ENDOCRINE: Denies excessive thirst, denies intolerance to heat or cold  PSYCHOSOCIAL: Denies any recent memory loss     Historical Information   History reviewed  No pertinent past medical history  History reviewed  No pertinent surgical history  Social History   Social History     Substance and Sexual Activity   Alcohol Use Yes    Comment: occassionally     Social History     Substance and Sexual Activity   Drug Use Never     Social History     Tobacco Use   Smoking Status Never Smoker   Smokeless Tobacco Not on file     History reviewed  No pertinent family history      Meds/Allergies   Medications Prior to Admission   Medication    apixaban (Eliquis) 2 5 mg     Current Facility-Administered Medications   Medication Dose Route Frequency    acetaminophen (TYLENOL) tablet 975 mg  975 mg Oral Q8H [facility]    docusate sodium (COLACE) capsule 100 mg  100 mg Oral BID    HYDROmorphone HCl (DILAUDID) injection 0 2 mg  0 2 mg Intravenous Q4H PRN    lidocaine (LIDODERM) 5 % patch 1 patch  1 patch Topical Once    naloxone (NARCAN) 0 04 mg/mL syringe 0 04 mg  0 04 mg Intravenous Q1MIN PRN    ondansetron (ZOFRAN) injection 4 mg  4 mg Intravenous Q4H PRN    oxyCODONE (ROXICODONE) IR tablet 2 5 mg  2 5 mg Oral Q4H PRN    oxyCODONE (ROXICODONE) IR tablet 5 mg  5 mg Oral Q4H PRN    polyethylene glycol (MIRALAX) packet 17 g  17 g Oral Daily PRN    senna-docusate sodium (SENOKOT S) 8 6-50 mg per tablet 1 tablet  1 tablet Oral HS     No Known Allergies    PHYSICAL EXAM:      Objective   Blood pressure 147/76, pulse 64, temperature 99 6 °F (37 6 °C), temperature source Oral, resp  rate 18, height 5' 5" (1 651 m), weight 98 3 kg (216 lb 11 4 oz), SpO2 93 %  Body mass index is 36 06 kg/m²  No intake or output data in the 24 hours ending 06/20/22 0725    General Appearance:   Alert, cooperative, no distress   HEENT:   Normocephalic, atraumatic, anicteric     Neck:   Supple, symmetrical, trachea midline   Lungs:   Equal chest rise, respirations unlabored    Heart:   Regular rate and rhythm   Abdomen:   Soft, non-tender, non-distended; normal bowel sounds; no masses, no organomegaly    Rectal:   Deferred    Extremities:   No cyanosis, clubbing or edema; LUE in sling    Neuro: Follows commands    Skin:   No jaundice, rashes, or lesions      LAB RESULTS:     Admission on 06/19/2022   Component Date Value    Extra Tube 06/19/2022 Hold for add-ons   Extra Tube 06/19/2022 Hold for add-ons   Extra Tube 06/19/2022 Hold for add-ons   Extra Tube 06/19/2022 Hold for add-ons       ABO Grouping 06/19/2022 B     Rh Factor 06/19/2022 Positive     Antibody Screen 06/19/2022 Negative     Specimen Expiration Date 06/19/2022 [id number]     Protime 06/19/2022 13 0     INR 06/19/2022 1 02     PTT 06/19/2022 24     WBC 06/19/2022 17 21 (A)    RBC 06/19/2022 4 36     Hemoglobin 06/19/2022 13 4     Hematocrit 06/19/2022 41 5     MCV 06/19/2022 95     MCH 06/19/2022 30 7     MCHC 06/19/2022 32 3     RDW 06/19/2022 12 0     MPV 06/19/2022 10 5     Platelets 82/20/9433 281     nRBC 06/19/2022 0     Neutrophils Relative 06/19/2022 85 (A)    Immat GRANS % 06/19/2022 1     Lymphocytes Relative 06/19/2022 9 (A)    Monocytes Relative 06/19/2022 5     Eosinophils Relative 06/19/2022 0     Basophils Relative 06/19/2022 0     Neutrophils Absolute 06/19/2022 14 66 (A)    Immature Grans Absolute 06/19/2022 0 15     Lymphocytes Absolute 06/19/2022 1 55     Monocytes Absolute 06/19/2022 0 79     Eosinophils Absolute 06/19/2022 0 01     Basophils Absolute 06/19/2022 0 05     Sodium 06/19/2022 141     Potassium 06/19/2022 3 5     Chloride 06/19/2022 108     CO2 06/19/2022 28     ANION GAP 06/19/2022 5     BUN 06/19/2022 17     Creatinine 06/19/2022 0 69     Glucose 06/19/2022 121     Calcium 06/19/2022 8 2 (A)    eGFR 06/19/2022 89     CKR(REACTION TIME) 06/19/2022 6 2     CKLY30 06/19/2022 1 0     CRTMA(RAPIDTEG MAX AMPLI* 06/19/2022 68 1     CFFMA (FUNCTIONAL FIBRIN* 06/19/2022 32 6 (A)    Total CK 06/19/2022 184     ABO Grouping 06/19/2022 B     Rh Factor 06/19/2022 Positive     CK-MB Index 06/19/2022 1 5     CK-MB 06/19/2022 2 8     Sodium 06/20/2022 135 (A)    Potassium 06/20/2022 4 1     Chloride 06/20/2022 105     CO2 06/20/2022 28     ANION GAP 06/20/2022 2 (A)    BUN 06/20/2022 16     Creatinine 06/20/2022 0 78     Glucose 06/20/2022 102     Calcium 06/20/2022 8 9     eGFR 06/20/2022 77        RADIOLOGY RESULTS: I have personally reviewed pertinent imaging studies  JERI Saleh  Chief Gastroenterology Fellow  [facility]  Available on [email or web address]

## 2022-06-20 NOTE — CASE MANAGEMENT
Case Management Assessment & Discharge Planning Note    Patient name Madai Watson  [facility] [address] [facility] [id number]  : 1950 Date 2022       Current Admission Date: 2022  Current Admission Diagnosis:Humeral fracture   Patient Active Problem List    Diagnosis Date Noted    Humeral fracture 2022    History of pulmonary embolism 2022    Abnormal CT scan, stomach 2022      LOS (days): 0  Geometric Mean LOS (GMLOS) (days):   Days to GMLOS:     OBJECTIVE:    Risk of Unplanned Readmission Score: 6 26         Current admission status: Inpatient       Preferred Pharmacy:   [id number] - [address] [address]  Phone: [phone] Fax: [phone]    Primary Care Provider: Saundra Winter MD    Primary Insurance:   Secondary Insurance:     ASSESSMENT:  Ernesto Santizo Proxies    There are no active Health Care Proxies on file  Advance Directives  Does patient have a [address] [place] [address] Avenue?: Yes  Does patient have Advance Directives?: Yes  Advance Directives: Living will  Primary Contact:  Jeffery Ricks,              Patient Information  Admitted from[de-identified] Home  Mental Status: Alert  Assessment information provided by[de-identified] Patient  Primary Caregiver: Self  Support Systems: Spouse/significant other, Children  Home entry access options   Select all that apply : No steps to enter home  Type of Current Residence: 2 story home (split level)  Upon entering residence, is there a bedroom on the main floor (no further steps)?: No  A bedroom is located on the following floor levels of residence (select all that apply):: 2nd Floor  Number of steps to 2nd floor from main floor: One Flight  In the last 12 months, was there a time when you were not able to pay the mortgage or rent on time?: No  In the last 12 months, was there a time when you did not have a steady place to sleep or slept in a shelter (including now)?: No  Homeless/housing insecurity resource given?: N/A  Living Arrangements: Lives w/ Spouse/significant other    Activities of Daily Living Prior to Admission  Functional Status: Independent  Completes ADLs independently?: Yes  Ambulates independently?: Yes  Does patient use assisted devices?: Yes  Assisted Devices (DME) used: Straight Cane  Does patient currently own DME?: Yes  What DME does the patient currently own?: Straight Cane  Does patient have a history of Outpatient Therapy (PT/OT)?: No  Does the patient have a history of Short-Term Rehab?: No  Does patient have a history of HHC?: No         Patient Information Continued  Income Source: Pension/senior living  Within the past 12 months, you worried that your food would run out before you got the money to buy more : Never true  Within the past 12 months, the food you bought just didn't last and you didn't have money to get more : Never true  Food insecurity resource given?: N/A  Does patient receive dialysis treatments?: No  Does patient have a history of substance abuse?: No  Does patient have a history of Mental Health Diagnosis?: No         Means of Transportation  Means of Transport to Appts[de-identified] Drives Self  In the past 12 months, has lack of transportation kept you from medical appointments or from getting medications?: No  In the past 12 months, has lack of transportation kept you from meetings, work, or from getting things needed for daily living?: No  Was application for public transport provided?: N/A        DISCHARGE DETAILS:    Discharge planning discussed with[de-identified] patient  Freedom of Choice: Yes                   Contacts  Patient Contacts:  Margarita Fernandes  Relationship to Patient[de-identified] Family  Contact Method: Phone  Phone Number: [phone]  Reason/Outcome: Continuity of Care, Emergency Contact, Discharge Planning                   Would you like to participate in our [address] [place] [address] service program?  : Yes Additional Comments: discussed STR and HHC,  works from home     CM reviewed d/c planning process including the following: identifying help at home, patient preference for d/c planning needs, Discharge Lounge, [facility] Meds to Bed program, availability of treatment team to discuss questions or concerns patient and/or family may have regarding understanding medications and recognizing signs and symptoms once discharged  CM also encouraged patient to follow up with all recommended appointments after discharge  Patient advised of importance for patient and family to participate in managing patients medical well being  Patient/caregiver received discharge checklist   Content reviewed  Patient/caregiver encouraged to participate in discharge plan of care prior to discharge home

## 2022-06-20 NOTE — OCCUPATIONAL THERAPY NOTE
Occupational Therapy Evaluation     Patient Name: Xenia Macias  UKBHG'R Date: 2022  Problem List  Principal Problem:    Humeral fracture  Active Problems:    History of pulmonary embolism    Abnormal CT scan, stomach    Past Medical History  Past Medical History:   Diagnosis Date    GERD (gastroesophageal reflux disease)      Past Surgical History  Past Surgical History:   Procedure Laterality Date     SECTION          22 0857   OT Last Visit   OT Visit Date 22   Note Type   Note type Evaluation   Restrictions/Precautions   Weight Bearing Precautions Per Order Yes   LUE Weight Bearing Per Order (S)  NWB   Braces or Orthoses Sling   Other Precautions Fall Risk;Pain;WBS;Cognitive   Pain Assessment   Pain Assessment Tool 0-10   Pain Score 8   Pain Location/Orientation Orientation: Left; Location: Arm   Pain Onset/Description Onset: Ongoing;Frequency: Constant/Continuous; Descriptor: Aching   Patient's Stated Pain Goal No pain   Hospital Pain Intervention(s) Ambulation/increased activity; Environmental changes;Repositioned; Emotional support   Home Living   Type of [address] [place] [address] Multi-level;Bed/bath upstairs   Bathroom Shower/Tub Walk-in shower   Bathroom Toilet Standard   Home Equipment [place]   Additional Comments   (Pt reports occasional use of single point cane PTA)   Prior Function   Level of [place] Independent with ADLs and functional mobility   Lives With Spouse   Receives Help From Family   ADL Assistance Independent   IADLs Independent   Falls in the last 6 months 1 to 4   Lifestyle   Autonomy Pt was I with ADLs/IADLs  Pt is a positive for driving  Reciprocal Relationships Pt lives with spouse  Pt has two daughters who live close by  Service to Others Pt is retired     Intrinsic Gratification Pt enjoys caring for her dog   Psychosocial   Psychosocial (WDL) WDL   Patient Behaviors/Mood Cooperative   ADL   Where Assessed Edge of bed   Eating Assistance 5 Supervision/Setup   Grooming Assistance 4  Minimal Assistance   UB Bathing Assistance 4  Minimal Assistance   LB Bathing Assistance 4  Minimal Assistance   UB Dressing Assistance 4  Minimal [address] 4  Minimal [address] [facility] 4  Minimal Assistance   Bed Mobility   Supine to Sit 4  Minimal assistance   Additional items Assist x 1; Increased time required   Sit to Supine 4  Minimal assistance   Additional items Assist x 1   Transfers   Sit to Stand 4  Minimal assistance   Additional items Assist x 1; Increased time required   Stand to Sit 4  Minimal assistance   Additional items Assist x 1; Increased time required   Additional Comments   (Pt required hand hold assist  Pt was taken to get CT scan )   Functional Mobility   Functional Mobility 4  Minimal assistance   Additional items Hand hold assistance   Balance   Static Sitting Fair -   Dynamic Sitting Fair -   Static Standing Poor +   Dynamic Standing Poor +   Ambulatory Poor +   Activity Tolerance   Activity Tolerance Patient limited by fatigue;Patient limited by pain   Medical Staff [address] DPT; Pt was present due to medical presentation that overall impacts occupational performance  Nurse Made Aware Pt appropriate to be seen  RUE Assessment   RUE Assessment WFL   LUE Assessment   LUE Assessment X   Cognition   Overall Cognitive Status WFL   Arousal/Participation Responsive; Cooperative   Attention Attends with cues to redirect   Orientation Level Oriented X4   Memory Within functional limits   Following Commands Follows one step commands without difficulty   Comments PTA pt struck head during fall  Pt required verbal cues for redirection to task  Pt had difficulty attending to task as she reverted to speaking upon her fall  Assessment   Limitation Decreased ADL status; Decreased Safe judgement during ADL;Decreased cognition;Decreased self-care trans;Decreased high-level ADLs   Prognosis Good   Assessment Angel bAebe is a 74 yo female (RHD) who experienced a fall after being knocked over when taking out her dog and was down for prolonged period of time  Pt co L shoulder pain  CT scan showed an impacted and displaced fracture of the L surgical neck of the humerus, displaced fracture of L greater tuberosity, displaced fracture of L lesser tuberosity along with marked rotation of the L humeral head and glenohumeral joint subluxation  Pending outpatient surgical interventions  Pt is HEATHER BLAKE with sling  Pt does not have relevant PMH  Pt's prior level of function was I with ADLs/IADLs living in a two story home with her spouse  Currently, pt is overall Min A with ADLs and functional transfers/mobility with HHA  Pt's limitations include decreased balance, decreased activity activity tolerance, increased dizziness, pain, fatigue, WBS, and increased time to complete functional tasks  Pt was educated on WBS, importance of repositioning to increase activity tolerance, and compensatory techniques to assist in occupational performance of ADLs, IADLs, and functional transfers/mobility  The patient's raw score on the AM-PAC Daily Activity inpatient short form is 19, standardized score is 40 22, greater than 39 4  Patients at this level are likely to benefit from discharge to home  Please refer to the recommendation of the Occupational Therapist for safe discharge planning  OT recommends additional inpatient rehab services vs home with skilled outpatient OT services based on progress and available family support  OT will continue to address the following goals listed below  Goals   Patient Goals No pain   LTG Time Frame 10-14   Long Term Goal See below   Plan   Treatment Interventions ADL retraining; Endurance training; Compensatory technique education; Energy conservation; Activityengagement;Patient/family training;Equipment evaluation/education;Cognitive reorientation; Functional transfer training   Goal Expiration Date 07/04/22   OT Frequency 3-5x/wk   Recommendation   OT Discharge Recommendation Post acute rehabilitation services  (vs home with outpatient rehab services pending her progression and available family support)   OT - OK to Discharge Yes   AM-PAC Daily Activity Inpatient   Lower Body Dressing 3   Bathing 3   Toileting 3   Upper Body Dressing 3   Grooming 3   Eating 4   Daily Activity Raw Score 19   Daily Activity Standardized Score (Calc for Raw Score >=11) 40 22   AM-PAC Applied Cognition Inpatient   Following a Speech/Presentation 4   Understanding Ordinary Conversation 4   Taking Medications 3   Remembering Where Things Are Placed or Put Away 3   Remembering List of 4-5 Errands 4   Taking Care of Complicated Tasks 3   Applied Cognition Raw Score 21   Applied Cognition Standardized Score 44 3   Pt will complete UB ADLs at a Mod I level utilizing one-handed techniques to increase independence in self-care tasks  Pt will complete functional transfer/mobility at a Mod I with G balance and safety  Pt will demonstrate G carry over of WBS in order to increase safety whe completing ADL  Pt will complete LB ADLs at a Mod I level while implementing compensatory techniques PRN  Pt will complete IADLs at a Mod I level with G balance and safety  Pt will attend to an ADL activity with no verbal cues for redirection       PUNEET Christianson

## 2022-06-21 ENCOUNTER — APPOINTMENT (INPATIENT)
Dept: GASTROENTEROLOGY | Facility: HOSPITAL | Age: 72
DRG: 563 | End: 2022-06-21
Payer: COMMERCIAL

## 2022-06-21 ENCOUNTER — ANESTHESIA (INPATIENT)
Dept: GASTROENTEROLOGY | Facility: HOSPITAL | Age: 72
DRG: 563 | End: 2022-06-21
Payer: COMMERCIAL

## 2022-06-21 ENCOUNTER — ANESTHESIA EVENT (INPATIENT)
Dept: GASTROENTEROLOGY | Facility: HOSPITAL | Age: 72
DRG: 563 | End: 2022-06-21
Payer: COMMERCIAL

## 2022-06-21 VITALS
SYSTOLIC BLOOD PRESSURE: 130 MMHG | WEIGHT: 216.71 LBS | BODY MASS INDEX: 36.11 KG/M2 | OXYGEN SATURATION: 93 % | TEMPERATURE: 98.9 F | HEIGHT: 65 IN | HEART RATE: 80 BPM | DIASTOLIC BLOOD PRESSURE: 66 MMHG | RESPIRATION RATE: 18 BRPM

## 2022-06-21 PROBLEM — K21.9 GASTROESOPHAGEAL REFLUX DISEASE: Status: ACTIVE | Noted: 2022-06-21

## 2022-06-21 LAB
ANION GAP SERPL CALCULATED.3IONS-SCNC: 3 MMOL/L (ref 4–13)
BASOPHILS # BLD AUTO: 0.04 THOUSANDS/ΜL (ref 0–0.1)
BASOPHILS NFR BLD AUTO: 0 % (ref 0–1)
BUN SERPL-MCNC: 12 MG/DL (ref 5–25)
CALCIUM SERPL-MCNC: 8.8 MG/DL (ref 8.3–10.1)
CHLORIDE SERPL-SCNC: 107 MMOL/L (ref 100–108)
CO2 SERPL-SCNC: 29 MMOL/L (ref 21–32)
CREAT SERPL-MCNC: 0.63 MG/DL (ref 0.6–1.3)
EOSINOPHIL # BLD AUTO: 0.15 THOUSAND/ΜL (ref 0–0.61)
EOSINOPHIL NFR BLD AUTO: 1 % (ref 0–6)
ERYTHROCYTE [DISTWIDTH] IN BLOOD BY AUTOMATED COUNT: 12.2 % (ref 11.6–15.1)
GFR SERPL CREATININE-BSD FRML MDRD: 91 ML/MIN/1.73SQ M
GLUCOSE SERPL-MCNC: 98 MG/DL (ref 65–140)
HCT VFR BLD AUTO: 34.1 % (ref 34.8–46.1)
HGB BLD-MCNC: 11 G/DL (ref 11.5–15.4)
IMM GRANULOCYTES # BLD AUTO: 0.03 THOUSAND/UL (ref 0–0.2)
IMM GRANULOCYTES NFR BLD AUTO: 0 % (ref 0–2)
LYMPHOCYTES # BLD AUTO: 2.89 THOUSANDS/ΜL (ref 0.6–4.47)
LYMPHOCYTES NFR BLD AUTO: 27 % (ref 14–44)
MCH RBC QN AUTO: 30.6 PG (ref 26.8–34.3)
MCHC RBC AUTO-ENTMCNC: 32.3 G/DL (ref 31.4–37.4)
MCV RBC AUTO: 95 FL (ref 82–98)
MONOCYTES # BLD AUTO: 1.22 THOUSAND/ΜL (ref 0.17–1.22)
MONOCYTES NFR BLD AUTO: 11 % (ref 4–12)
NEUTROPHILS # BLD AUTO: 6.33 THOUSANDS/ΜL (ref 1.85–7.62)
NEUTS SEG NFR BLD AUTO: 61 % (ref 43–75)
NRBC BLD AUTO-RTO: 0 /100 WBCS
PLATELET # BLD AUTO: 246 THOUSANDS/UL (ref 149–390)
PMV BLD AUTO: 11.3 FL (ref 8.9–12.7)
POTASSIUM SERPL-SCNC: 3.8 MMOL/L (ref 3.5–5.3)
RBC # BLD AUTO: 3.6 MILLION/UL (ref 3.81–5.12)
SODIUM SERPL-SCNC: 139 MMOL/L (ref 136–145)
WBC # BLD AUTO: 10.66 THOUSAND/UL (ref 4.31–10.16)

## 2022-06-21 PROCEDURE — 88305 TISSUE EXAM BY PATHOLOGIST: CPT | Performed by: PATHOLOGY

## 2022-06-21 PROCEDURE — 88305 TISSUE EXAM BY PATHOLOGIST: CPT | Performed by: SPECIALIST

## 2022-06-21 PROCEDURE — NC001 PR NO CHARGE: Performed by: PHYSICIAN ASSISTANT

## 2022-06-21 PROCEDURE — 0DB38ZX EXCISION OF LOWER ESOPHAGUS, VIA NATURAL OR ARTIFICIAL OPENING ENDOSCOPIC, DIAGNOSTIC: ICD-10-PCS | Performed by: INTERNAL MEDICINE

## 2022-06-21 PROCEDURE — 0DB68ZX EXCISION OF STOMACH, VIA NATURAL OR ARTIFICIAL OPENING ENDOSCOPIC, DIAGNOSTIC: ICD-10-PCS | Performed by: INTERNAL MEDICINE

## 2022-06-21 PROCEDURE — 43239 EGD BIOPSY SINGLE/MULTIPLE: CPT | Performed by: INTERNAL MEDICINE

## 2022-06-21 PROCEDURE — 80048 BASIC METABOLIC PNL TOTAL CA: CPT | Performed by: STUDENT IN AN ORGANIZED HEALTH CARE EDUCATION/TRAINING PROGRAM

## 2022-06-21 PROCEDURE — 99238 HOSP IP/OBS DSCHRG MGMT 30/<: CPT | Performed by: PHYSICIAN ASSISTANT

## 2022-06-21 PROCEDURE — 0DB78ZX EXCISION OF STOMACH, PYLORUS, VIA NATURAL OR ARTIFICIAL OPENING ENDOSCOPIC, DIAGNOSTIC: ICD-10-PCS | Performed by: INTERNAL MEDICINE

## 2022-06-21 PROCEDURE — 85025 COMPLETE CBC W/AUTO DIFF WBC: CPT | Performed by: STUDENT IN AN ORGANIZED HEALTH CARE EDUCATION/TRAINING PROGRAM

## 2022-06-21 RX ORDER — PANTOPRAZOLE SODIUM 40 MG/1
40 TABLET, DELAYED RELEASE ORAL
Status: DISCONTINUED | OUTPATIENT
Start: 2022-06-21 | End: 2022-06-21 | Stop reason: HOSPADM

## 2022-06-21 RX ORDER — PROPOFOL 10 MG/ML
INJECTION, EMULSION INTRAVENOUS AS NEEDED
Status: DISCONTINUED | OUTPATIENT
Start: 2022-06-21 | End: 2022-06-21

## 2022-06-21 RX ORDER — ACETAMINOPHEN 325 MG/1
650 TABLET ORAL EVERY 6 HOURS PRN
Qty: 30 TABLET | Refills: 0 | Status: SHIPPED | OUTPATIENT
Start: 2022-06-21 | End: 2022-06-22 | Stop reason: SDUPTHER

## 2022-06-21 RX ORDER — ONDANSETRON 2 MG/ML
INJECTION INTRAMUSCULAR; INTRAVENOUS AS NEEDED
Status: DISCONTINUED | OUTPATIENT
Start: 2022-06-21 | End: 2022-06-21

## 2022-06-21 RX ORDER — OXYCODONE HYDROCHLORIDE 5 MG/1
5 TABLET ORAL EVERY 4 HOURS PRN
Qty: 28 TABLET | Refills: 0 | Status: SHIPPED | OUTPATIENT
Start: 2022-06-21 | End: 2022-06-22 | Stop reason: SDUPTHER

## 2022-06-21 RX ORDER — SODIUM CHLORIDE 9 MG/ML
INJECTION, SOLUTION INTRAVENOUS CONTINUOUS PRN
Status: DISCONTINUED | OUTPATIENT
Start: 2022-06-21 | End: 2022-06-21

## 2022-06-21 RX ORDER — LIDOCAINE HYDROCHLORIDE 10 MG/ML
INJECTION, SOLUTION EPIDURAL; INFILTRATION; INTRACAUDAL; PERINEURAL AS NEEDED
Status: DISCONTINUED | OUTPATIENT
Start: 2022-06-21 | End: 2022-06-21

## 2022-06-21 RX ORDER — DOCUSATE SODIUM 100 MG/1
100 CAPSULE, LIQUID FILLED ORAL 2 TIMES DAILY
Qty: 10 CAPSULE | Refills: 0 | Status: SHIPPED | OUTPATIENT
Start: 2022-06-21 | End: 2022-06-22 | Stop reason: SDUPTHER

## 2022-06-21 RX ORDER — DEXAMETHASONE SODIUM PHOSPHATE 10 MG/ML
INJECTION, SOLUTION INTRAMUSCULAR; INTRAVENOUS AS NEEDED
Status: DISCONTINUED | OUTPATIENT
Start: 2022-06-21 | End: 2022-06-21

## 2022-06-21 RX ORDER — PANTOPRAZOLE SODIUM 40 MG/1
40 TABLET, DELAYED RELEASE ORAL
Qty: 30 TABLET | Refills: 0 | Status: SHIPPED | OUTPATIENT
Start: 2022-06-22 | End: 2022-06-22 | Stop reason: SDUPTHER

## 2022-06-21 RX ADMIN — SODIUM CHLORIDE: 9 INJECTION, SOLUTION INTRAVENOUS at 12:44

## 2022-06-21 RX ADMIN — OXYCODONE HYDROCHLORIDE 5 MG: 5 TABLET ORAL at 14:22

## 2022-06-21 RX ADMIN — OXYCODONE HYDROCHLORIDE 5 MG: 5 TABLET ORAL at 05:54

## 2022-06-21 RX ADMIN — ACETAMINOPHEN 975 MG: 325 TABLET, FILM COATED ORAL at 02:04

## 2022-06-21 RX ADMIN — OXYCODONE HYDROCHLORIDE 5 MG: 5 TABLET ORAL at 17:24

## 2022-06-21 RX ADMIN — LIDOCAINE HYDROCHLORIDE 100 MG: 10 INJECTION, SOLUTION EPIDURAL; INFILTRATION; INTRACAUDAL; PERINEURAL at 12:52

## 2022-06-21 RX ADMIN — ONDANSETRON 4 MG: 2 INJECTION INTRAMUSCULAR; INTRAVENOUS at 12:58

## 2022-06-21 RX ADMIN — OXYCODONE HYDROCHLORIDE 5 MG: 5 TABLET ORAL at 02:05

## 2022-06-21 RX ADMIN — DEXAMETHASONE SODIUM PHOSPHATE 10 MG: 10 INJECTION, SOLUTION INTRAMUSCULAR; INTRAVENOUS at 12:58

## 2022-06-21 RX ADMIN — PROPOFOL 170 MG: 10 INJECTION, EMULSION INTRAVENOUS at 12:52

## 2022-06-21 RX ADMIN — ACETAMINOPHEN 975 MG: 325 TABLET, FILM COATED ORAL at 09:37

## 2022-06-21 RX ADMIN — DOCUSATE SODIUM 100 MG: 100 CAPSULE, LIQUID FILLED ORAL at 09:37

## 2022-06-21 RX ADMIN — ENOXAPARIN SODIUM 30 MG: 30 INJECTION SUBCUTANEOUS at 09:37

## 2022-06-21 NOTE — ASSESSMENT & PLAN NOTE
Distant history of PE 5 years ago per patient  Attributed to trauma/fracture     On eliquis at home - holding for EGD 6/21  [facility] for DVT ppx

## 2022-06-21 NOTE — INCIDENTAL FINDINGS
The following findings require follow up:  Radiographic finding   Finding:   CT head without contrast-incidental findingMild cerebral atrophy with chronic small vessel ischemic white matter disease    Mild scattered sinus mucosal thickening is noted   "  CT spine cervical without contrast-incidental finding degenerative changes of cervical spine   "  CT chest abdomen pelvis with contrast-incidental finding-coronary artery calcifications  The heart is enlarged  Atherosclerotic changes in the aortic arch    Right upper pole renal cyst with accompanying small subcentimeter low-density lesions in the right kidney, too small to actively characterize, however likely represent cysts    Hiatal hernia  The esophagus is diffusely dilated  Patsi Reil Patsi Reil Diastases of the rectus abdominis musculature  Small umbilical hernia containing fat  Patsi Reil Patsi Reil Patsi Reil The osseous structures are demineralized   "     Follow up required: pcp   Follow up should be done within one week(s)    Please notify the following clinician to assist with the follow up:   Dr Luisa Dasilva MD - patient PCP

## 2022-06-21 NOTE — PROGRESS NOTES
[address] [facility]  Progress Note - Key Marcelino 1950, 73 y o  female MRN: [id number]  Unit/Bed#: [facility] 609-01 Encounter: [id number]  Primary Care Provider: Nadege Quezada MD   Date and time admitted to hospital: 6/19/2022  8:14 AM    Abnormal CT scan, stomach  Assessment & Plan  Correlate for gastroparesis and achalasia  GI consulted- recommend EDG   Patient NPO status; EGD today  Protonix 40mg QD  Holding Eliquis for possible dilation     History of pulmonary embolism  Assessment & Plan  Distant history of PE 5 years ago per patient  Attributed to trauma/fracture  On eliquis at home - holding for EGD 6/21  Merc for DVT ppx     * Humeral fracture  Assessment & Plan  S/p fall, imaging demonstrated a displaced, comminuted proximal left humeral fracture  Non-op per ortho, NWB, sling  Ortho rec of ORIF as an outpatient  Eve pain protocol    Disposition: stable     SUBJECTIVE:  Chief Complaint: left arm pain    Subjective: "To I need to keep this arm in a sling?"    OBJECTIVE:   Vitals:   Temp:  [98 5 °F (36 9 °C)-100 4 °F (38 °C)] 98 9 °F (37 2 °C)  HR:  [68-81] 72  Resp:  [16-20] 16  BP: (119-157)/(54-72) 141/65    Intake/Output:  I/O       06/19 0701  06/20 0700 06/20 0701  06/21 0700 06/21 0701  06/22 0700    P  O   440 0    I V  (mL/kg)   300 (3 1)    Total Intake(mL/kg)  440 (4 5) 300 (3 1)    Net  +440 +300           Unmeasured Urine Occurrence 1 x           Nutrition: Diet Surgical; Surgical Soft/Lite Meal  GI Proph/Bowel Reg: senokot  VTE Prophylaxis:Enoxaparin (Lovenox)     Physical Exam:   GENERAL APPEARANCE:  Comfortable  NEURO:  Alert and oriented x3, no focal deficits  HEENT:  EOM intact, no pain on EOM, oropharynx clear and patent  CV:  Regular rate rhythm  LUNGS:  Clear to auscultation bilaterally  GI:  Soft nontender, nondistended  :  Voiding  MSK:  Left upper extremity in sling; will not move left shoulder secondary to pain; left elbow 4/5, left hand and wrist 4/5; right arm 4/5, bilateral lower extremities 4/5 neurovascularly intact  SKIN:  Warm and well perfused    Invasive Devices  Report    Peripheral Intravenous Line  Duration           Peripheral IV Right Antecubital -- days    Peripheral IV 06/21/22 Dorsal (posterior); Right Forearm <1 day          Airway  Duration           ETT  Cuffed; Inflated 7 mm <1 day               No airway ETT cuffed tube at time of my initial evaluation; above to placed for egd       Lab Results:   BMP/CMP:   Lab Results   Component Value Date    SODIUM 139 06/21/2022    K 3 8 06/21/2022     06/21/2022    CO2 29 06/21/2022    BUN 12 06/21/2022    CREATININE 0 63 06/21/2022    CALCIUM 8 8 06/21/2022    EGFR 91 06/21/2022    and CBC:   Lab Results   Component Value Date    WBC 10 66 (H) 06/21/2022    HGB 11 0 (L) 06/21/2022    HCT 34 1 (L) 06/21/2022    MCV 95 06/21/2022     06/21/2022    MCH 30 6 06/21/2022    MCHC 32 3 06/21/2022    RDW 12 2 06/21/2022    MPV 11 3 06/21/2022    NRBC 0 06/21/2022     Imaging/EKG Studies: I have personally reviewed pertinent reports       Other Studies: none

## 2022-06-21 NOTE — UTILIZATION REVIEW
Inpatient Admission Authorization Request   NOTIFICATION OF INPATIENT ADMISSION/INPATIENT AUTHORIZATION REQUEST   SERVICING FACILITY:   [facility]  Address: [address] [place] [address], [address] [place] [address]  Tax ID: [id number]  NPI: [id number]  Place of Service: Inpatient [address] [facility] Code: 24     ATTENDING PROVIDER:  Attending Name and NPI#: Casa Knox [[id number]]  Address: [address] [place] [address], [address] [place] [address]  Phone: [phone]     UTILIZATION REVIEW CONTACT:  Cammy Escobar Utilization   Network Utilization Review Department  Phone: [phone]  Fax: [phone]  Email: [email or web address]     PHYSICIAN ADVISORY SERVICES:  FOR TRAI-SH-FPTS REVIEW - MEDICAL NECESSITY DENIAL  Phone: [phone]  Fax: [phone]  Email: [email or web address]     TYPE OF REQUEST:  Inpatient Status     ADMISSION INFORMATION:  ADMISSION DATE/TIME: 6/20/22  4:47 AM  PATIENT DIAGNOSIS CODE/DESCRIPTION:  Abnormal CT scan [R93 89]  Fall, initial encounter [W19  XXXA]  Fracture of humeral head, closed, left, initial encounter [S42 292A]  Other injury of unspecified body region, initial encounter [T14  8XXA]  DISCHARGE DATE/TIME: No discharge date for patient encounter  IMPORTANT INFORMATION:  Please contact the Cammy Escobar directly with any questions or concerns regarding this request  Department voicemails are confidential     Send requests for admission clinical reviews, concurrent reviews, approvals, and administrative denials due to lack of clinical to fax [phone]

## 2022-06-21 NOTE — ASSESSMENT & PLAN NOTE
S/p fall, imaging demonstrated a displaced, comminuted proximal left humeral fracture  Non-op per ortho, NWB, sling  Ortho rec of ORIF as an outpatient  Eve pain protocol

## 2022-06-21 NOTE — CASE MANAGEMENT
Case Management Discharge Planning Note    Patient name Musa Trujillo  Location [facility] 609/[facility] 519-96 MRN [id number]  : 1950 Date 2022       Current Admission Date: 2022  Current Admission Diagnosis:Humeral fracture   Patient Active Problem List    Diagnosis Date Noted    Gastroesophageal reflux disease 2022    Humeral fracture 2022    History of pulmonary embolism 2022    Abnormal CT scan, stomach 2022      LOS (days): 1  Geometric Mean LOS (GMLOS) (days):   Days to GMLOS:     OBJECTIVE:  Risk of Unplanned Readmission Score: 7 38         Current admission status: Inpatient   Preferred Pharmacy:   [facility]/pharmacy #[id number] - [address] [address]  Phone: [phone] Fax: [phone]    Primary Care Provider: Linda Boggs MD    Primary Insurance: BLUE CROSS  Secondary Insurance:     DISCHARGE DETAILS:    CM spoke to pt's   He works from home and informed his job that he will need to tend to his wife while she recovers  He is in agreement with a home d/c today  Medications will be sent to the [facility] in [place]     Pt's  will come for the pt around 1700

## 2022-06-21 NOTE — ASSESSMENT & PLAN NOTE
Correlate for gastroparesis and achalasia    GI consulted- recommend EDG   Patient NPO status; EGD today  Protonix 40mg QD  Holding Eliquis for possible dilation

## 2022-06-21 NOTE — ANESTHESIA POSTPROCEDURE EVALUATION
Post-Op Assessment Note    CV Status:  Stable  Pain Score: 0    Pain management: adequate     Mental Status:  Alert and awake   Hydration Status:  Stable   PONV Controlled:  None   Airway Patency:  Patent       Staff: CRNA         No complications documented      /65 (06/21/22 1312)    Temp 98 9 °F (37 2 °C) (06/21/22 1312)    Pulse 73 (06/21/22 1312)   Resp 16 (06/21/22 1312)    SpO2 93 % (06/21/22 1312)

## 2022-06-21 NOTE — DISCHARGE SUMMARY
[address] [facility]  Discharge- Musa Trujillo 1950, 73 y o  female MRN: [id number]  [facility] Encounter: [id number]  Primary Care Provider: Linda Boggs MD   Date and time admitted to hospital: 6/19/2022  8:14 AM    Medical Problems             Resolved Problems  Date Reviewed: 6/21/2022   None               See separate note progress note by me; 06/21/2022 at 1329  Admission Date:   Admission Orders (From admission, onward)     Ordered        06/20/22 0447  Inpatient Admission  Once            06/19/22 1017  Inpatient Admission  Once,   Status:  Canceled                        Admitting Diagnosis: Abnormal CT scan [R93 89]  Fall, initial encounter [W19  XXXA]  Fracture of humeral head, closed, left, initial encounter [S42 292A]  Other injury of unspecified body region, initial encounter [T14  8XXA]    HPI: "Musa Trujillo is a 73 y o  female who presents as a level B trauma after a fall  Patient is on eloquis for PE  Patient states that around midnight, she went to let her dog out, her dog was "spooked" by a racoon, pulled her forwards and she fell, landing on her L shoulder  Due to pain, she was unable to get up and was on the ground for approximately 7 hours  Per EMS, she was found to be 94 degrees F temporal  At time of evaluation, patient states that she feels very cold and is complaining of L shoulder pain  Initial temp was 97 degrees oral "  Via Dr Nik Davison, resident    Procedures Performed:   Orders Placed This Encounter   Procedures    Fast Ultrasound    POC AAA US       Summary of Hospital Course:  Patient is a 73 year female that presented to the trauma service as a level B trauma after a fall, Eliquis for history of PE  Patient had with orthopedic consult secondary to imaging noted/resulting comminuted proximal left humeral fracture with Orthopedics reporting non op with this time and will discuss surgical options such as rTSA or ORIF  Patient had multimodal pain control with left upper extremity in sling and denied worsening left pain symptomatology is  Patient with distended esophagus and stomach; ECG revealed grade 2 esophagitis to which Protonix was started by GI  Patient had successfully negotiated PT OT evaluation and treatment with recommendation for home discharge  Patient with verbal understanding of all clinical imaging findings, discharge instructions, follow-up verbalized agreement patient current treatment plan  Significant Findings, Care, Treatment and Services Provided:  Comminuted left humeral fracture; distended esophagus and stomach on CT scan    Complications:  None    Condition at Discharge: stable         Discharge instructions/Information to patient and family:   See after visit summary for information provided to patient and family  Provisions for Follow-Up Care:  See after visit summary for information related to follow-up care and any pertinent home health orders  PCP: Usha Middleton MD    Disposition: Home    Planned Readmission: No    Discharge Statement   I spent 28 minutes discharging the patient  This time was spent on the day of discharge  I had direct contact with the patient on the day of discharge  Additional documentation is required if more than 30 minutes were spent on discharge  Discharge Medications:  See after visit summary for reconciled discharge medications provided to patient and family

## 2022-06-22 DIAGNOSIS — S42.292A FRACTURE OF HUMERAL HEAD, CLOSED, LEFT, INITIAL ENCOUNTER: ICD-10-CM

## 2022-06-22 RX ORDER — OXYCODONE HYDROCHLORIDE 5 MG/1
5 TABLET ORAL EVERY 4 HOURS PRN
Qty: 28 TABLET | Refills: 0 | Status: SHIPPED | OUTPATIENT
Start: 2022-06-22

## 2022-06-22 RX ORDER — DOCUSATE SODIUM 100 MG/1
100 CAPSULE, LIQUID FILLED ORAL 2 TIMES DAILY
Qty: 10 CAPSULE | Refills: 0 | Status: SHIPPED | OUTPATIENT
Start: 2022-06-22 | End: 2022-06-27

## 2022-06-22 RX ORDER — ACETAMINOPHEN 325 MG/1
650 TABLET ORAL EVERY 6 HOURS PRN
Qty: 30 TABLET | Refills: 0 | Status: SHIPPED | OUTPATIENT
Start: 2022-06-22 | End: 2022-06-27

## 2022-06-22 RX ORDER — PANTOPRAZOLE SODIUM 40 MG/1
40 TABLET, DELAYED RELEASE ORAL
Qty: 30 TABLET | Refills: 0 | Status: SHIPPED | OUTPATIENT
Start: 2022-06-22 | End: 2022-07-16

## 2022-06-22 NOTE — UTILIZATION REVIEW
Notification of Discharge   This is a Notification of Discharge from our facility [address]  Please be advised that this patient has been discharge from our facility  Below you will find the admission and discharge date and time including the patients disposition  UTILIZATION REVIEW CONTACT:  Cammy Escobar  Utilization   Network Utilization Review Department  Phone: [phone] x carefully listen to the prompts  All voicemails are confidential   Email: [email or web address]     PHYSICIAN ADVISORY SERVICES:  FOR ULFB-XI-AWOC REVIEW - MEDICAL NECESSITY DENIAL  Phone: [phone]  Fax: [phone]  Email: [email or web address]     PRESENTATION DATE: 6/19/2022  8:14 AM  OBERVATION ADMISSION DATE:  INPATIENT ADMISSION DATE: 6/20/22  4:47 AM   DISCHARGE DATE: 6/21/2022  5:51 PM  DISPOSITION: Home/Self Care Home/Self Care      IMPORTANT INFORMATION:  Send all requests for admission clinical reviews, approved or denied determinations and any other requests to dedicated fax number below belonging to the campus where the patient is receiving treatment   List of dedicated fax numbers:  [address] DENIALS (Administrative/Medical Necessity) [phone]   [address]  [facility] (Maternity/NICU/Pediatrics) [phone]   [facility] [phone]   [address] [facility] [address] [phone]   [address] [place] [address] [phone]   [address] [facility] [address] [place] [address],[address] [phone]   [facility]  [phone]   [address] [facility] [address]  [address] [facility] [address] [facility] [phone]

## 2022-06-24 ENCOUNTER — OFFICE VISIT (OUTPATIENT)
Dept: OBGYN CLINIC | Facility: CLINIC | Age: 72
End: 2022-06-24
Payer: COMMERCIAL

## 2022-06-24 VITALS
BODY MASS INDEX: 36.06 KG/M2 | HEIGHT: 65 IN | DIASTOLIC BLOOD PRESSURE: 70 MMHG | OXYGEN SATURATION: 97 % | HEART RATE: 86 BPM | SYSTOLIC BLOOD PRESSURE: 140 MMHG

## 2022-06-24 DIAGNOSIS — S42.202A CLOSED TRAUMATIC DISPLACED FRACTURE OF PROXIMAL END OF LEFT HUMERUS, INITIAL ENCOUNTER: Primary | ICD-10-CM

## 2022-06-24 PROCEDURE — 99203 OFFICE O/P NEW LOW 30 MIN: CPT | Performed by: ORTHOPAEDIC SURGERY

## 2022-06-24 NOTE — PROGRESS NOTES
Assessment:     1  Closed traumatic displaced fracture of proximal end of left humerus, initial encounter        Plan:     Problem List Items Addressed This Visit        Musculoskeletal and Integument    Closed fracture of proximal end of left humerus - Primary     68-year-old female with a displaced left proximal humerus comminuted intra-articular fracture  The CT scan and x-ray were reviewed in the office today  I did discuss I do not feel as though this would heal well with non operative treatment  Patient and her  were agreeable to this  We did discuss a referral to my partner Dr Murali Mejia for possible surgical intervention in the form of a shoulder replacement  Patient and her  were agreeable to this and a referral was provided for this  She will continue with the sling  She may follow up with me as needed  All patient's questions were answered to her satisfaction  This note is created using dictation transcription  It may contain typographical errors, grammatical errors, improperly dictated words, background noise and other errors  Subjective:     Patient ID: Kei Lawler is a 73 y o  female  Chief Complaint:  Terrell Ross is a 68-year-old female who presents to the office today for evaluation of her left shoulder  Patient states she was taking her dog out to use the bathroom on 6/18/22 when the dog ran and pulled her causing her to fall landing onto her shoulder  She presented to [facility] as a trauma on 6/19/22  She underwent a x-ray and a CT scan of her left shoulder and was diagnosed with a left proximal humerus fracture  She was placed in a sling and told to follow up out patient  She states her pain is well controlled  She notes intermittent sharp shooting pain  She has been taking Oxycodone as needed for pain  She has been compliant with the sling  She denies prior surgery on her left shoulder    Information on patient's intake form was reviewed  Allergy:  Allergies   Allergen Reactions    Penicillins Rash     Medications:  current meds:   No current facility-administered medications for this visit  Past Medical History:  Past Medical History:   Diagnosis Date    GERD (gastroesophageal reflux disease)      Past Surgical History:  Past Surgical History:   Procedure Laterality Date     SECTION       Family History:  Family History   Problem Relation Age of Onset    Stroke Mother      Social History:  Social History     Substance and Sexual Activity   Alcohol Use Yes    Comment: occassionally     Social History     Substance and Sexual Activity   Drug Use Never     Social History     Tobacco Use   Smoking Status Never Smoker   Smokeless Tobacco Never Used     Review of Systems   Constitutional: Negative for chills and fever  HENT: Negative for drooling and sneezing  Eyes: Negative for redness  Respiratory: Negative for cough and wheezing  Cardiovascular: Negative  Gastrointestinal: Negative for nausea and vomiting  Genitourinary: Negative  Musculoskeletal: Positive for arthralgias (Left shoulder)  Pain with walking    Neurological: Negative for weakness and numbness  Waking up at night   Psychiatric/Behavioral: Negative for behavioral problems  The patient is not nervous/anxious  Objective:  BP Readings from Last 1 Encounters:   22 140/70      Wt Readings from Last 1 Encounters:   22 98 3 kg (216 lb 11 4 oz)      BMI:   Estimated body mass index is 36 06 kg/m² as calculated from the following:    Height as of this encounter: 5' 5" (1 651 m)  Weight as of 22: 98 3 kg (216 lb 11 4 oz)  BSA:   Estimated body surface area is 2 05 meters squared as calculated from the following:    Height as of this encounter: 5' 5" (1 651 m)  Weight as of 22: 98 3 kg (216 lb 11 4 oz)  Physical Exam  Vitals and nursing note reviewed     Constitutional:       Appearance: Normal appearance  She is well-developed  HENT:      Head: Normocephalic and atraumatic  Right Ear: External ear normal       Left Ear: External ear normal    Eyes:      General:         Right eye: No discharge  Left eye: No discharge  Extraocular Movements: Extraocular movements intact  Conjunctiva/sclera: Conjunctivae normal    Pulmonary:      Effort: Pulmonary effort is normal  No respiratory distress  Musculoskeletal:      Cervical back: Neck supple  Comments: As noted in HPI   Skin:     General: Skin is warm and dry  Neurological:      Mental Status: She is alert and oriented to person, place, and time  Deep Tendon Reflexes: Reflexes are normal and symmetric  Psychiatric:         Mood and Affect: Mood normal          Behavior: Behavior normal        Left Shoulder Exam     Tenderness   Left shoulder tenderness location: proximal humerus  Other   Erythema: absent  Sensation: normal  Pulse: present     Comments:  ROM and strengthening deferred due to fracture             I have personally reviewed pertinent films in PACS and my interpretation is Left shoulder x-ray and CT scan show intra-articular displaced proximal humerus comminuted fracture      Scribe Attestation    I,:  Becky Chao MA am acting as a scribe while in the presence of the attending physician :       I,:  Autumn Langston MD personally performed the services described in this documentation    as scribed in my presence :

## 2022-06-24 NOTE — ASSESSMENT & PLAN NOTE
74-year-old female with a displaced left proximal humerus comminuted intra-articular fracture  The CT scan and x-ray were reviewed in the office today  I did discuss I do not feel as though this would heal well with non operative treatment  Patient and her  were agreeable to this  We did discuss a referral to my partner Dr Roosevelt Munoz for possible surgical intervention in the form of a shoulder replacement  Patient and her  were agreeable to this and a referral was provided for this  She will continue with the sling  She may follow up with me as needed  All patient's questions were answered to her satisfaction  This note is created using dictation transcription  It may contain typographical errors, grammatical errors, improperly dictated words, background noise and other errors

## 2022-06-24 NOTE — QUICK NOTE
Patient has planned readmission to hospital after left upper extremity swelling decreases surrounding left proximal humeral fracture per Orthopedics.     Admission date 06/19/2022 admitting physician Dr. Bushra Wang

## 2022-06-28 ENCOUNTER — OFFICE VISIT (OUTPATIENT)
Dept: OBGYN CLINIC | Facility: OTHER | Age: 72
End: 2022-06-28
Payer: COMMERCIAL

## 2022-06-28 VITALS
HEART RATE: 67 BPM | DIASTOLIC BLOOD PRESSURE: 84 MMHG | WEIGHT: 216 LBS | SYSTOLIC BLOOD PRESSURE: 143 MMHG | HEIGHT: 65 IN | BODY MASS INDEX: 35.99 KG/M2

## 2022-06-28 DIAGNOSIS — S42.202A CLOSED TRAUMATIC DISPLACED FRACTURE OF PROXIMAL END OF LEFT HUMERUS, INITIAL ENCOUNTER: ICD-10-CM

## 2022-06-28 PROCEDURE — 99204 OFFICE O/P NEW MOD 45 MIN: CPT | Performed by: ORTHOPAEDIC SURGERY

## 2022-06-28 NOTE — PROGRESS NOTES
Assessment  Diagnoses and all orders for this visit:    Closed traumatic displaced fracture of proximal end of left humerus, initial encounter        Discussion and Plan:    X-ray and CT scan results were reviewed in the office today  Treatment options were discussed being continuing non operative care vs an acute reverse total shoulder  In my opinion given the patient's significant improvement in pain, her lack of significant pain when I perform gentle circumduction exercises and the amount of time has elapsed since the injury I do feel that nonoperative care should be continued and I do feel that has an excellent chance of providing her with a similar outcome to an acute reverse total shoulder arthroplasty  This would leave open the possibility of a reverse total shoulder arthroplasty for a malunion or nonunion if she is painful or dysfunctional   I explained this the best of my ability I do feel her and her  understand and agree with the plan to continue nonoperative care  Advised to continue the use of the sling for 1 more week  She may begin to work on wrist and elbow ROM as well as pendulum exercises  In 1 weeks time she may work on active and active assist ROM exercises  In 6 weeks she may work on more active ROM as well as strengthening exercises  If she is not happy with her shoulder once the fracture is healed, a reverse total shoulder may be re-discussed at that time  Follow up in 3 weeks time with repeat left shoulder x-rays  Again at the end of the visit I spent time discussing with them that they did receive multiple different opinions from multiple different physicians regarding this injury including operative and nonoperative options but given my evaluation of the patient, the images and the above discussion I do feel confident that nonoperative care is appropriate at this point      Subjective:   Patient ID: Cecil Hartley is a 73 y o  female      The patient presents with a chief complaint of left shoulder pain  I am seeing Jody Eduardo in consultation at the request of Dr Rommel Osborne  The pain began 10 day(s) ago and is associated with an acute injury  Jody Eduardo states on 6/18/22 she was letting her dog out the door when the dog saw a raccoon this resulted in the dog pulling her  She hit her left side on the side of the house, then passed out and fell  The patient describes the pain as sharp in intensity,  constant in timing, and localizes the pain to the  left shoulder globally  The pain is worse with movement and relieved by rest  She notes intermittent numbness/tingling to her left hand  The pain is not associated with constitutional symptoms  The patient is awoken at night by the pain  The patient has been immobilized in a sling  She wears the sling 100 percent of the time  She stopped Oxycodone for pain control  The Narcotic pain medication did cause constipation  She is now taking Tylenol for pain control  The following portions of the patient's history were reviewed and updated as appropriate: allergies, current medications, past family history, past medical history, past social history, past surgical history and problem list     Review of Systems   Constitutional: Negative for chills, fever and unexpected weight change  HENT: Negative for hearing loss, nosebleeds and sore throat  Eyes: Negative for pain, redness and visual disturbance  Respiratory: Negative for cough, shortness of breath and wheezing  Cardiovascular: Negative for chest pain, palpitations and leg swelling  Gastrointestinal: Negative for abdominal pain, nausea and vomiting  Endocrine: Negative for polydipsia and polyuria  Genitourinary: Negative for difficulty urinating and hematuria  Musculoskeletal: Positive for arthralgias and myalgias  Negative for joint swelling  Skin: Negative for rash and wound  Neurological: Negative for dizziness, numbness and headaches  Psychiatric/Behavioral: Negative for decreased concentration, dysphoric mood and suicidal ideas  The patient is not nervous/anxious  Objective:  /84 (BP Location: Right arm, Patient Position: Sitting, Cuff Size: Adult)   Pulse 67   Ht 5' 5" (1 651 m)   Wt 98 kg (216 lb)   BMI 35 94 kg/m²       Left Shoulder Exam     Other   Erythema: absent  Scars: absent  Sensation: normal  Pulse: present     Comments:  Shoulder moves as a unit without pain          Physical Exam  Vitals and nursing note reviewed  Constitutional:       Appearance: She is well-developed  HENT:      Head: Normocephalic and atraumatic  Eyes:      Pupils: Pupils are equal, round, and reactive to light  Pulmonary:      Effort: Pulmonary effort is normal       Breath sounds: Normal breath sounds  Musculoskeletal:      Cervical back: Neck supple  Skin:     General: Skin is warm and dry  Neurological:      Mental Status: She is alert and oriented to person, place, and time  I have personally reviewed pertinent films in PACS and my interpretation is as follows  X-rays of the left shoulder demonstrate a comminuted humeral head/neck fracture  CT scan of the left shoulder demonstrate impacted and displaced humeral surgical neck fracture  Greater tuberosity fracture and lesser tuberosity fracture    No dislocation of the humeral head fragment     Scribe Attestation    I,:  Miguelina Chao am acting as a scribe while in the presence of the attending physician :       I,:  Vivian Macias MD personally performed the services described in this documentation    as scribed in my presence :

## 2022-07-12 ENCOUNTER — EVALUATION (OUTPATIENT)
Dept: PHYSICAL THERAPY | Facility: CLINIC | Age: 72
End: 2022-07-12
Payer: COMMERCIAL

## 2022-07-12 DIAGNOSIS — M25.512 CHRONIC LEFT SHOULDER PAIN: Primary | ICD-10-CM

## 2022-07-12 DIAGNOSIS — G89.29 CHRONIC LEFT SHOULDER PAIN: Primary | ICD-10-CM

## 2022-07-12 DIAGNOSIS — S42.202A CLOSED TRAUMATIC DISPLACED FRACTURE OF PROXIMAL END OF LEFT HUMERUS, INITIAL ENCOUNTER: ICD-10-CM

## 2022-07-12 PROCEDURE — 97140 MANUAL THERAPY 1/> REGIONS: CPT | Performed by: PHYSICAL THERAPIST

## 2022-07-12 PROCEDURE — 97161 PT EVAL LOW COMPLEX 20 MIN: CPT | Performed by: PHYSICAL THERAPIST

## 2022-07-12 NOTE — PROGRESS NOTES
PT Evaluation     Today's date: 2022  Patient name: Vince Chun  : 1950  MRN: [id number]  Referring provider: Summer Granados*  Dx:   Encounter Diagnosis     ICD-10-CM    1  Chronic left shoulder pain  M25 512     G89 29    2  Closed traumatic displaced fracture of proximal end of left humerus, initial encounter  S4                   Assessment  Assessment details: Vince Chun is a 73 y o  female presenting to outpatient physical therapy at [facility] with complaints of L shoulder pain s/p L proximal humerus fx on 22   They present with decreased range of motion, decreased strength, limited flexibility, poor postural awareness, poor body mechanics, poor balance, decreased tolerance to activity and decreased functional mobility due to Chronic left shoulder pain  (primary encounter diagnosis)  Closed traumatic displaced fracture of proximal end of left humerus, initial encounter  Corrin Osgood would benefit from skilled physical therapy to address noted impairments in order to allow for full functional return to work and ADL-related activities  Thank you for the referral!  Impairments: abnormal muscle firing, abnormal or restricted ROM, activity intolerance, impaired balance, impaired physical strength, lacks appropriate home exercise program, pain with function and poor body mechanics  Barriers to therapy: n/a  Understanding of Dx/Px/POC: excellent  Goals  ST   Independent with HEP in 2 weeks  2  Decrease pain by 50% in 3 wks  3   Increase shoulder elevation AROM to 30 degrees in 3 weeks     LT  Achieve FOTO score of 52/100 in 6 weeks   2   Able to d/c the sling completely in 6 weeks  3  Strength = 4-/5 R shoulder all planes in 6 weeks  4  Able to reach 75 deg shoulder elevation AROM in 6 weeks  Plan  Plan details: RE in 6 weeks    Patient would benefit from: skilled physical therapy  Planned modality interventions: cryotherapy, electrical stimulation/Kyrgyz stimulation and thermotherapy: hydrocollator packs  Planned therapy interventions: abdominal trunk stabilization, manual therapy, neuromuscular re-education, therapeutic activities, therapeutic exercise, body mechanics training and home exercise program  Frequency: 2x week  Duration in visits: 12  Duration in weeks: 6  Plan of Care beginning date: 7/12/2022  Plan of Care expiration date: 8/26/2022  Treatment plan discussed with: patient        Subjective Evaluation    History of Present Illness  Mechanism of injury: Pt c/o L shoulder pain s/p L proximal humeral head fx due to a fall  DOI 6/19/22 (3 5 weeks ago)  Treated conservatively with sling  Plan is to continue with conservative tx and will consider reverse total shoulder replacement if lack of positive results  During f/u with orthopedics on 6/28/22, pt was advised to discontinue sling use after 7/5 and then begin AAROM and AROM exercises  She is allowed to begin resistive exercise on 8/9/22  She reports compliance with her restrictions, though continues to wear the sling due to not feeling ready  She does not sleep with the sling on  Pain levels are 3/10 on avg  She takes tylenol around the clock  Positive for tingling in the L hand  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  She does not work  In her free time she enjoyed gardening, dance-walking exercising for 40 mins to an hour every other day and housework  Her driving is limited because of the tumor and she is not currently driving  She currently is quite restricted in her ADLs and hobbies secondary to the arm pain  She is becoming more independent with bathing, dressing, though requires help with the bra and shirt  She does not cook  She is having increased dizziness/unsteadiness while walking and standing, general body weakness (worse in the AM)  She continues to have flashbacks of the injury and expresses anxieties surrounding current state        Patient Goals  Patient goals for therapy: decreased edema, decreased pain, improved balance, increased motion, return to work, return to [employer] activities, independence with ADLs/IADLs and increased strength          Objective     Postural Observations    Additional Postural Observation Details  Sling donned appropriately  Sling off, increased L arm guarding and elevated L shoulder; unable to don or doff sling independently    Tenderness     Additional Tenderness Details  TTP L bicep, anterior-lateral-posterior and superior shoulder    Cervical/Thoracic Screen   Cervical range of motion within normal limits    Active Range of Motion   Left Shoulder   Flexion: 0 degrees   Extension: 0 degrees   Abduction: 0 degrees   External rotation 0°: 0 degrees   Internal rotation 0°: 0 degrees     Additional Active Range of Motion Details  Wrist arom fair all directions  Hand arom fair all directions  Elbow flex mild restriction  Elbow ext mod restriction    Passive Range of Motion   Left Shoulder   Flexion: 15 degrees   Abduction: 20 degrees   External rotation 0°: 0 degrees   Internal rotation 0°: Left shoulder passive internal rotation at 0 degrees: to body  Strength/Myotome Testing     Additional Strength Details  Unable to test             Precautions: h/o L proximal humeral head fx  DOI: 6/19/22  Protocol: d/c sling on 7/5/22; begin AAROM, AROM on 7/5/22, begin resistive exercise on 8/9/22  Other factors: conservative tx, will consider reverse TSA PRN  EPOC: 8/26/22      HEP:  Access Code: [email or web address]  URL: [email or web address]  Date: 07/12/2022  Prepared by: Benjamin Villalba    Exercises  · Towel Roll Squeeze - 20 reps  · Wrist AROM Flexion Extension - 20 reps  · Wrist AROM Radial Ulnar Deviation - 20 reps  · Seated Elbow Flexion and Extension AROM - 20 reps  · Seated Forearm Pronation and Supination AROM - 20 reps  · Seated Shoulder Shrugs - 20 reps  · Circular Shoulder Pendulum with Table Support - 20 reps            Manuals 7/12            R elbow PROM             R sh' PROM             RUE, sh' STM DANNI                         Neuro Re-Ed                                                                                                        Ther Ex             Pendulums 1'            Cervical arom             Wrist arom x20            Elbow flex/ext x20            Elbow sup/pron x20            Shoulder shrug x20            scap rtrxn             Towel squeeze x20            Table slides                          Bike?                                                     Ther Activity                                       Gait Training                                       Modalities             MHP/CP

## 2022-07-14 DIAGNOSIS — S42.292A FRACTURE OF HUMERAL HEAD, CLOSED, LEFT, INITIAL ENCOUNTER: ICD-10-CM

## 2022-07-16 RX ORDER — PANTOPRAZOLE SODIUM 40 MG/1
40 TABLET, DELAYED RELEASE ORAL
Qty: 90 TABLET | Refills: 1 | Status: SHIPPED | OUTPATIENT
Start: 2022-07-16 | End: 2022-08-15

## 2022-07-19 ENCOUNTER — OFFICE VISIT (OUTPATIENT)
Dept: PHYSICAL THERAPY | Facility: CLINIC | Age: 72
End: 2022-07-19
Payer: COMMERCIAL

## 2022-07-19 DIAGNOSIS — G89.29 CHRONIC LEFT SHOULDER PAIN: Primary | ICD-10-CM

## 2022-07-19 DIAGNOSIS — M25.512 CHRONIC LEFT SHOULDER PAIN: Primary | ICD-10-CM

## 2022-07-19 DIAGNOSIS — S42.202A CLOSED TRAUMATIC DISPLACED FRACTURE OF PROXIMAL END OF LEFT HUMERUS, INITIAL ENCOUNTER: ICD-10-CM

## 2022-07-19 PROCEDURE — 97140 MANUAL THERAPY 1/> REGIONS: CPT

## 2022-07-19 PROCEDURE — 97110 THERAPEUTIC EXERCISES: CPT

## 2022-07-19 PROCEDURE — 97112 NEUROMUSCULAR REEDUCATION: CPT

## 2022-07-19 NOTE — PROGRESS NOTES
Daily Note     Today's date: 2022  Patient name: Ranjeet Mcnulty  : 1950  MRN: [id number]  Referring provider: Monica France*  Dx:   Encounter Diagnosis     ICD-10-CM    1  Chronic left shoulder pain  M25 512     G89 29    2  Closed traumatic displaced fracture of proximal end of left humerus, initial encounter  S4                   Subjective: Pt reports she already feels much better since IE  Objective: See treatment diary below      Assessment: Initiated PT POC today  Pt shows good understanding of her precautions and of her HEP issued  So showed improved PROM and less fear avoidance  Tolerated treatment well  Patient demonstrated fatigue post treatment, exhibited good technique with therapeutic exercises and would benefit from continued PT      Plan: Continue per plan of care  Progress treatment as tolerated  Precautions: h/o L proximal humeral head fx  DOI: 22  Protocol: d/c sling on 22; begin AAROM, AROM on 22, begin resistive exercise on 22  Other factors: conservative tx, will consider reverse TSA PRN  EPOC: 22      HEP:  Access Code: [email or web address]  URL: [email or web address]  Date: 2022  Prepared by: Dolores Hodgkin    Exercises  · Towel Roll Squeeze - 20 reps  · Wrist AROM Flexion Extension - 20 reps  · Wrist AROM Radial Ulnar Deviation - 20 reps  · Seated Elbow Flexion and Extension AROM - 20 reps  · Seated Forearm Pronation and Supination AROM - 20 reps  · Seated Shoulder Shrugs - 20 reps  · Circular Shoulder Pendulum with Table Support - 20 reps            Manuals            R elbow PROM  WE           NELSON ' PROM  WE           DIDI ' [facility] [address] WE                        Neuro Re-Ed                                                                                                        Ther Ex             Pendulums 1' 1'           Cervical arom             Wrist arom x20 x20           Elbow flex/ext x20 x20 Elbow sup/pron x20 x20           Shoulder shrug x20 x20           scap rtrxn             Towel squeeze x20 x20           Table slides  x10 ea                        Bike?                                                     Ther Activity                                       Gait Training                                       Modalities             MHP/CP

## 2022-07-21 ENCOUNTER — APPOINTMENT (OUTPATIENT)
Dept: RADIOLOGY | Facility: OTHER | Age: 72
End: 2022-07-21
Payer: COMMERCIAL

## 2022-07-21 ENCOUNTER — OFFICE VISIT (OUTPATIENT)
Dept: OBGYN CLINIC | Facility: OTHER | Age: 72
End: 2022-07-21
Payer: COMMERCIAL

## 2022-07-21 ENCOUNTER — OFFICE VISIT (OUTPATIENT)
Dept: PHYSICAL THERAPY | Facility: CLINIC | Age: 72
End: 2022-07-21
Payer: COMMERCIAL

## 2022-07-21 VITALS
HEIGHT: 65 IN | HEART RATE: 82 BPM | WEIGHT: 216.05 LBS | SYSTOLIC BLOOD PRESSURE: 130 MMHG | BODY MASS INDEX: 36 KG/M2 | DIASTOLIC BLOOD PRESSURE: 84 MMHG

## 2022-07-21 DIAGNOSIS — S42.202A CLOSED TRAUMATIC DISPLACED FRACTURE OF PROXIMAL END OF LEFT HUMERUS, INITIAL ENCOUNTER: ICD-10-CM

## 2022-07-21 DIAGNOSIS — G54.0 AXILLARY NERVE PALSY: ICD-10-CM

## 2022-07-21 DIAGNOSIS — S42.292D OTHER CLOSED DISPLACED FRACTURE OF PROXIMAL END OF LEFT HUMERUS WITH ROUTINE HEALING, SUBSEQUENT ENCOUNTER: Primary | ICD-10-CM

## 2022-07-21 DIAGNOSIS — M25.512 CHRONIC LEFT SHOULDER PAIN: Primary | ICD-10-CM

## 2022-07-21 DIAGNOSIS — S42.92XD CLOSED FRACTURE OF LEFT SHOULDER WITH ROUTINE HEALING, SUBSEQUENT ENCOUNTER: ICD-10-CM

## 2022-07-21 DIAGNOSIS — G89.29 CHRONIC LEFT SHOULDER PAIN: Primary | ICD-10-CM

## 2022-07-21 PROBLEM — S42.202D CLOSED FRACTURE OF PROXIMAL END OF LEFT HUMERUS WITH ROUTINE HEALING: Status: ACTIVE | Noted: 2022-06-20

## 2022-07-21 PROCEDURE — 97140 MANUAL THERAPY 1/> REGIONS: CPT

## 2022-07-21 PROCEDURE — 73030 X-RAY EXAM OF SHOULDER: CPT

## 2022-07-21 PROCEDURE — 99214 OFFICE O/P EST MOD 30 MIN: CPT | Performed by: ORTHOPAEDIC SURGERY

## 2022-07-21 PROCEDURE — 97110 THERAPEUTIC EXERCISES: CPT

## 2022-07-21 NOTE — PROGRESS NOTES
Daily Note     Today's date: 2022  Patient name: Sully Salinas  : 1950  MRN: [id number]  Referring provider: Abimbola Carrasquillo*  Dx:   Encounter Diagnosis     ICD-10-CM    1  Chronic left shoulder pain  M25 512     G89 29    2  Closed traumatic displaced fracture of proximal end of left humerus, initial encounter  S42                    Subjective: Pt reports she was a little sore after LV but not much  Follow up with Dr Tess Knutson today, another follow up in 6-8 weeks, his concern at this time is possible nerve damage as per pt  Objective: See treatment diary below      Assessment: Pt continues to show improved PROM and less discomfort  She is showing improved confidence and using her arm for light ADL's  Tolerated treatment well  Patient demonstrated fatigue post treatment, exhibited good technique with therapeutic exercises and would benefit from continued PT      Plan: Continue per plan of care  Progress treatment as tolerated  Awaiting results of radiology report form earlier today  Precautions: h/o L proximal humeral head fx  DOI: 22  Protocol: d/c sling on 22; begin AAROM, AROM on 22, begin resistive exercise on 22  Other factors: conservative tx, will consider reverse TSA PRN  EPOC: 22      HEP:  Access Code: [email or web address]  URL: [email or web address]  Date: 2022  Prepared by: Jocelin Baum    Exercises  · Towel Roll Squeeze - 20 reps  · Wrist AROM Flexion Extension - 20 reps  · Wrist AROM Radial Ulnar Deviation - 20 reps  · Seated Elbow Flexion and Extension AROM - 20 reps  · Seated Forearm Pronation and Supination AROM - 20 reps  · Seated Shoulder Shrugs - 20 reps  · Circular Shoulder Pendulum with Table Support - 20 reps            Manuals           R elbow PROM  WE WE          R sh' PROM  WE WE          DIDI, sh' [facility] [address] WE WE                       Neuro Re-Ed Ther Ex             Pendulums 1' 1' 1'          Cervical arom   x10 ea          Wrist arom x20 x20 x20          Elbow flex/ext x20 x20 x20          Elbow sup/pron x20 x20 x20          Shoulder shrug x20 x20 x20          scap rtrxn   x20          Towel squeeze x20 x20 x20          Table slides  x10 ea x20 ea                       Bike?                                                     Ther Activity                                       Gait Training                                       Modalities             MHP/CP

## 2022-07-21 NOTE — PROGRESS NOTES
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    Discussion and Plan:    · 4 weeks s/p left proximal humerus fracture sustained on 6/18/2022  Patient has deltoid atony on examination today but overall has improved in terms of her symptoms and pain  If the deltoid diagnosis continues at her next visit she will be indicated for an EMG study to further evaluate the damage to the nerve  She understood and all questions were answered  · Patient is to continue with physical therapy  Instructed patient to begin AROM of the left shoulder in PT and HEP  · Follow up in 6-8 weeks for re evaluation of symptoms  Repeat x rays of the left shoulder at this time  Subjective:   Patient ID: Michael Garcia is a 73 y o  female    74 y/o female who presents today for a follow up visit for her left shoulder  Patient sustained an injury on 6/18/2022 after her dog pulled her and she fell onto an outstretched left shoulder  Today, she reports great improvements in her pain and symptoms  She has been performing PT/HEP with benefit  She is still unable to raise her arm against gravity  No fevers or chills        The following portions of the patient's history were reviewed and updated as appropriate: allergies, current medications, past family history, past medical history, past social history, past surgical history and problem list     Objective: There were no vitals taken for this visit  Left Shoulder Exam     Tenderness   The patient is experiencing no tenderness  Other   Erythema: absent  Sensation: normal  Pulse: present     Comments:  Deltoid atony noted  Strength testing deferred today due to fracture            Physical Exam  Constitutional:       Appearance: She is well-developed     Eyes:      Pupils: Pupils are equal, round, and reactive to light    Pulmonary:      Effort: Pulmonary effort is normal       Breath sounds: Normal breath sounds  Skin:     General: Skin is warm and dry  Neurological:      Mental Status: She is alert and oriented to person, place, and time  Psychiatric:         Behavior: Behavior normal          Thought Content: Thought content normal          Judgment: Judgment normal        I have personally reviewed pertinent films in PACS and my interpretation is as follows      X Ray Left Shoulder 7/21/2022: comminuted humeral head/neck fracture with early callus/healing formation present    Scribe Attestation    I,:  Josiane Bashir am acting as a scribe while in the presence of the attending physician :       I,:  Marco A Nolasco MD personally performed the services described in this documentation    as scribed in my presence :

## 2022-07-26 ENCOUNTER — OFFICE VISIT (OUTPATIENT)
Dept: PHYSICAL THERAPY | Facility: CLINIC | Age: 72
End: 2022-07-26
Payer: COMMERCIAL

## 2022-07-26 DIAGNOSIS — M25.512 CHRONIC LEFT SHOULDER PAIN: Primary | ICD-10-CM

## 2022-07-26 DIAGNOSIS — G89.29 CHRONIC LEFT SHOULDER PAIN: Primary | ICD-10-CM

## 2022-07-26 DIAGNOSIS — S42.202A CLOSED TRAUMATIC DISPLACED FRACTURE OF PROXIMAL END OF LEFT HUMERUS, INITIAL ENCOUNTER: ICD-10-CM

## 2022-07-26 PROCEDURE — 97110 THERAPEUTIC EXERCISES: CPT

## 2022-07-26 PROCEDURE — 97140 MANUAL THERAPY 1/> REGIONS: CPT

## 2022-07-26 NOTE — PROGRESS NOTES
Daily Note     Today's date: 2022  Patient name: Enma Whitley  : 1950  MRN: [id number]  Referring provider: Suzanne Jaeger*  Dx:   Encounter Diagnosis     ICD-10-CM    1  Chronic left shoulder pain  M25 512     G89 29    2  Closed traumatic displaced fracture of proximal end of left humerus, initial encounter  S4                   Subjective: Pt reports she continues to see improvements daily  Objective: See treatment diary below      Assessment: Pt continues to benefit from manual PROM to improve motion  She has become much less fearful which has largely helped her progress  She is using the arm for light ADL's and feeling good except for when she tried carrying a half gallon of water with involved arm  Tolerated treatment well  Patient demonstrated fatigue post treatment, exhibited good technique with therapeutic exercises and would benefit from continued PT      Plan: Continue per plan of care  Progress treatment as tolerated  Awaiting results of radiology report form earlier today  Precautions: h/o L proximal humeral head fx  DOI: 22  Protocol: d/c sling on 22; begin AAROM, AROM on 22, begin resistive exercise on 22  Other factors: conservative tx, will consider reverse TSA PRN  EPOC: 22      HEP:  Access Code: [email or web address]  URL: [email or web address]/  Date: 2022  Prepared by: Concepcion Chou    Exercises  · Towel Roll Squeeze - 20 reps  · Wrist AROM Flexion Extension - 20 reps  · Wrist AROM Radial Ulnar Deviation - 20 reps  · Seated Elbow Flexion and Extension AROM - 20 reps  · Seated Forearm Pronation and Supination AROM - 20 reps  · Seated Shoulder Shrugs - 20 reps  · Circular Shoulder Pendulum with Table Support - 20 reps            Manuals          R elbow PROM  WE WE WE         R sh' PROM  WE WE WE         DIDI, sh' [employer] [address] WE                      Neuro Re-Ed Ther Ex             Pulleys    3 min  flx         Pendulums 1' 1' 1' 1'         Cervical arom   x10 ea x20 ea         Wrist arom x20 x20 x20 x20         Elbow flex/ext x20 x20 x20 x20         Elbow sup/pron x20 x20 x20 x20         Shoulder shrug x20 x20 x20 x20         scap rtrxn   x20 x20         Towel squeeze x20 x20 x20 x20         Table slides  x10 ea x20 ea x20 ea                      Bike?                                                     Ther Activity                                       Gait Training                                       Modalities             MHP/CP

## 2022-07-28 ENCOUNTER — OFFICE VISIT (OUTPATIENT)
Dept: PHYSICAL THERAPY | Facility: CLINIC | Age: 72
End: 2022-07-28
Payer: COMMERCIAL

## 2022-07-28 DIAGNOSIS — G89.29 CHRONIC LEFT SHOULDER PAIN: Primary | ICD-10-CM

## 2022-07-28 DIAGNOSIS — S42.202A CLOSED TRAUMATIC DISPLACED FRACTURE OF PROXIMAL END OF LEFT HUMERUS, INITIAL ENCOUNTER: ICD-10-CM

## 2022-07-28 DIAGNOSIS — M25.512 CHRONIC LEFT SHOULDER PAIN: Primary | ICD-10-CM

## 2022-07-28 PROCEDURE — 97110 THERAPEUTIC EXERCISES: CPT | Performed by: PHYSICAL THERAPIST

## 2022-07-28 PROCEDURE — 97140 MANUAL THERAPY 1/> REGIONS: CPT | Performed by: PHYSICAL THERAPIST

## 2022-07-28 NOTE — PROGRESS NOTES
Daily Note     Today's date: 2022  Patient name: Ade Rodgers  : 1950  MRN: [id number]  Referring provider: Paris Viveros*  Dx:   Encounter Diagnosis     ICD-10-CM    1  Chronic left shoulder pain  M25 512     G89 29    2  Closed traumatic displaced fracture of proximal end of left humerus, initial encounter  S42         Start Time: 1703          Subjective: Doing well and continues to be encouraged by her progress, feels stronger and more mobile  Objective: See treatment diary below      Assessment: Pulleys into flexion primarily bringing the L elbow to full flexion and then shrugging and R side bending in order to bring the handle higher as a compensation for true shoulder flexion  Tactile and verbal cues given to avoid these compensations  She was able to perform a small degree of true shoulder flexion independently though still shows tendency for shrug  PROM moving well, hard end feel all directions  Intro'd rotator cuff isometrics for shoulder activation  Weak at first, but there was a noticeable activation into each direction by the 10th rep  Instructed on how to perform for HEP  Pt verbalized understanding  Tolerated treatment well  Patient demonstrated fatigue post treatment, exhibited good technique with therapeutic exercises and would benefit from continued PT      Plan: Continue per plan of care  Progress treatment as tolerated  Precautions: h/o L proximal humeral head fx  DOI: 22  Protocol: d/c sling on 22; begin AAROM, AROM on 22, begin resistive exercise on 22  Other factors: conservative tx, will consider reverse TSA PRN  EPOC: 22      HEP:  Access Code: [email or web address]  URL: [email or web address]  Date: 2022  Prepared by: Flaco Hallmark    Exercises  · Towel Roll Squeeze - 20 reps  · Wrist AROM Flexion Extension - 20 reps  · Wrist AROM Radial Ulnar Deviation - 20 reps  · Seated Elbow Flexion and Extension AROM - 20 reps  · Seated Forearm Pronation and Supination AROM - 20 reps  · Seated Shoulder Shrugs - 20 reps  · Circular Shoulder Pendulum with Table Support - 20 reps      Access Code: [email or web address]  URL: [email or web address]  Date: 07/28/2022  Prepared by: Jaydon Feng    Exercises  · Standing Isometric Shoulder Flexion with Doorway - Arm Bent - 10 reps - 5 sec hold  · Standing Isometric Shoulder Abduction with Doorway - Arm Bent - 10 reps - 5 hold  · Standing Isometric Shoulder Extension with Doorway - Arm Bent - 10 reps - 5 hold  · Isometric Shoulder Adduction - 10 reps - 5 hold                  Manuals 7/12 7/19 7/21 7/26 7/28        R elbow PROM  WE WE WE         R sh' PROM  WE WE WE DANNI        RUE, sh' STM DANNI WE WE WE         RC iso flex/ext, ER/IR, abd/add     5"x10                                               Neuro Re-Ed                                                                                                        Ther Ex             Pulleys    3 min  flx 4' flex        Pendulums 1' 1' 1' 1'         Cervical arom   x10 ea x20 ea         Wrist arom x20 x20 x20 x20         Elbow flex/ext x20 x20 x20 x20         Elbow sup/pron x20 x20 x20 x20         Shoulder shrug x20 x20 x20 x20         scap rtrxn   x20 x20         Towel squeeze x20 x20 x20 x20         Table slides  x10 ea x20 ea x20 ea                      Bike?                                                     Ther Activity                                       Gait Training                                       Modalities             CP     5'

## 2022-08-02 ENCOUNTER — APPOINTMENT (OUTPATIENT)
Dept: PHYSICAL THERAPY | Facility: CLINIC | Age: 72
End: 2022-08-02
Payer: COMMERCIAL

## 2022-08-04 ENCOUNTER — OFFICE VISIT (OUTPATIENT)
Dept: PHYSICAL THERAPY | Facility: CLINIC | Age: 72
End: 2022-08-04
Payer: COMMERCIAL

## 2022-08-04 DIAGNOSIS — M25.512 CHRONIC LEFT SHOULDER PAIN: Primary | ICD-10-CM

## 2022-08-04 DIAGNOSIS — G89.29 CHRONIC LEFT SHOULDER PAIN: Primary | ICD-10-CM

## 2022-08-04 DIAGNOSIS — S42.202A CLOSED TRAUMATIC DISPLACED FRACTURE OF PROXIMAL END OF LEFT HUMERUS, INITIAL ENCOUNTER: ICD-10-CM

## 2022-08-04 PROCEDURE — 97112 NEUROMUSCULAR REEDUCATION: CPT

## 2022-08-04 PROCEDURE — 97110 THERAPEUTIC EXERCISES: CPT

## 2022-08-04 PROCEDURE — 97140 MANUAL THERAPY 1/> REGIONS: CPT

## 2022-08-04 NOTE — PROGRESS NOTES
Daily Note     Today's date: 2022  Patient name: Ren Wells  : 1950  MRN: [id number]  Referring provider: Rhiannon Aguayo*  Dx:   Encounter Diagnosis     ICD-10-CM    1  Chronic left shoulder pain  M25 512     G89 29    2  Closed traumatic displaced fracture of proximal end of left humerus, initial encounter  S4                   Subjective: Pt reports that she had a rough couple of days as she has been struggling mentally with the trama of her accident when she saw where it took place  She notes that she felt like she took a big step back after that and her whole body was affected  Objective: See treatment diary below      Assessment: Continued to try to educate and demonstrate pulleys for pt as she continues to perform them wrong- by end of session she was performing much better  Also introduced cane exercises for passive ROM which she performed much better than the pulleys  She continues to show improvement with her PROM and some strength with manual isometrics  By end of session she was also in a much better place mentally  Tolerated treatment well  Patient demonstrated fatigue post treatment, exhibited good technique with therapeutic exercises and would benefit from continued PT      Plan: Continue per plan of care  Progress treatment as tolerated  Precautions: h/o L proximal humeral head fx  DOI: 22  Protocol: d/c sling on 22; begin AAROM, AROM on 22, begin resistive exercise on 22  Other factors: conservative tx, will consider reverse TSA PRN  EPOC: 22      HEP:  Access Code: [email or web address]  URL: [email or web address]  Date: 2022  Prepared by: Jaydon Feng    Exercises  · Towel Roll Squeeze - 20 reps  · Wrist AROM Flexion Extension - 20 reps  · Wrist AROM Radial Ulnar Deviation - 20 reps  · Seated Elbow Flexion and Extension AROM - 20 reps  · Seated Forearm Pronation and Supination AROM - 20 reps  · Seated Shoulder Shrugs - 20 reps  · Circular Shoulder Pendulum with Table Support - 20 reps      Access Code: [email or web address]  URL: [email or web address]  Date: 07/28/2022  Prepared by: Jocelin Baum    Exercises  · Standing Isometric Shoulder Flexion with Doorway - Arm Bent - 10 reps - 5 sec hold  · Standing Isometric Shoulder Abduction with Doorway - Arm Bent - 10 reps - 5 hold  · Standing Isometric Shoulder Extension with Doorway - Arm Bent - 10 reps - 5 hold  · Isometric Shoulder Adduction - 10 reps - 5 hold                  Manuals 7/12 7/19 7/21 7/26 7/28 8/4       R elbow PROM  WE WE WE         R sh' PROM  WE WE WE DANNI WE       RUE, sh' STM DANNI WE WE WE         RC iso flex/ext, ER/IR, abd/add     5"x10 WE                                              Neuro Re-Ed                                                                                                        Ther Ex             Pulleys    3 min  flx 4' flex 4' flx       Pendulums 1' 1' 1' 1'  1'       Cervical arom   x10 ea x20 ea  20       Wrist arom x20 x20 x20 x20         Elbow flex/ext x20 x20 x20 x20         Elbow sup/pron x20 x20 x20 x20         Shoulder shrug x20 x20 x20 x20         scap rtrxn   x20 x20         Towel squeeze x20 x20 x20 x20         Table slides  x10 ea x20 ea x20 ea  20       Cane flex, ABD passive      x10 ea                    Bike?                                                     Ther Activity                                       Gait Training                                       Modalities             CP     5'

## 2022-08-09 ENCOUNTER — OFFICE VISIT (OUTPATIENT)
Dept: PHYSICAL THERAPY | Facility: CLINIC | Age: 72
End: 2022-08-09
Payer: COMMERCIAL

## 2022-08-09 DIAGNOSIS — S42.202A CLOSED TRAUMATIC DISPLACED FRACTURE OF PROXIMAL END OF LEFT HUMERUS, INITIAL ENCOUNTER: ICD-10-CM

## 2022-08-09 DIAGNOSIS — G89.29 CHRONIC LEFT SHOULDER PAIN: Primary | ICD-10-CM

## 2022-08-09 DIAGNOSIS — M25.512 CHRONIC LEFT SHOULDER PAIN: Primary | ICD-10-CM

## 2022-08-09 PROCEDURE — 97110 THERAPEUTIC EXERCISES: CPT

## 2022-08-09 PROCEDURE — 97112 NEUROMUSCULAR REEDUCATION: CPT

## 2022-08-09 PROCEDURE — 97140 MANUAL THERAPY 1/> REGIONS: CPT

## 2022-08-09 NOTE — PROGRESS NOTES
Daily Note     Today's date: 2022  Patient name: Shanelle Vargas  : 1950  MRN: [id number]  Referring provider: Ann Marie Kc*  Dx:   Encounter Diagnosis     ICD-10-CM    1  Chronic left shoulder pain  M25 512     G89 29    2  Closed traumatic displaced fracture of proximal end of left humerus, initial encounter  S4A                   Subjective: Pt reports her exercises are going well and she is starting to be able to use her arm for a lot more around the house  Objective: See treatment diary below      Assessment: Pt in much better spirits today  She has continued to show great progress with her PROM with less and less discomfort and treatments progress  She is using the arm functionally with light ADL's now  Doing much better with pulleys as she is actually getting the shoulder moving and not just flx/ext the elbow  She does really well with an self hand held AA ROM into flexion  Tolerated treatment well  Patient demonstrated fatigue post treatment, exhibited good technique with therapeutic exercises and would benefit from continued PT      Plan: Continue per plan of care  Progress treatment as tolerated  Precautions: h/o L proximal humeral head fx  DOI: 22  Protocol: d/c sling on 22; begin AAROM, AROM on 22, begin resistive exercise on 22  Other factors: conservative tx, will consider reverse TSA PRN  EPOC: 22      HEP:  Access Code: [email or web address]  URL: [email or web address]  Date: 2022  Prepared by: Josefa Heredia    Exercises  · Towel Roll Squeeze - 20 reps  · Wrist AROM Flexion Extension - 20 reps  · Wrist AROM Radial Ulnar Deviation - 20 reps  · Seated Elbow Flexion and Extension AROM - 20 reps  · Seated Forearm Pronation and Supination AROM - 20 reps  · Seated Shoulder Shrugs - 20 reps  · Circular Shoulder Pendulum with Table Support - 20 reps      Access Code: [email or web address]  URL: [email or web address]  Date: 07/28/2022  Prepared by: Eugene Nguyễn    Exercises  · Standing Isometric Shoulder Flexion with Doorway - Arm Bent - 10 reps - 5 sec hold  · Standing Isometric Shoulder Abduction with Doorway - Arm Bent - 10 reps - 5 hold  · Standing Isometric Shoulder Extension with Doorway - Arm Bent - 10 reps - 5 hold  · Isometric Shoulder Adduction - 10 reps - 5 hold                  Manuals 7/12 7/19 7/21 7/26 7/28 8/4 8/9      R elbow PROM  WE WE WE         R sh' PROM  WE WE WE DANNI WE WE      RUE, sh' STM DANNI WE WE WE         RC iso flex/ext, ER/IR, abd/add     5"x10 WE WE                                             Neuro Re-Ed                                                                                                        Ther Ex             Pulleys    3 min  flx 4' flex 4' flx 5 min flx      Pendulums 1' 1' 1' 1'  1' 1'      Cervical arom   x10 ea x20 ea  20       Wrist arom x20 x20 x20 x20         Elbow flex/ext x20 x20 x20 x20   1# x20      Elbow sup/pron x20 x20 x20 x20         Shoulder shrug x20 x20 x20 x20         scap rtrxn   x20 x20         Towel squeeze x20 x20 x20 x20   20      Table slides  x10 ea x20 ea x20 ea  20 x20 ea      Cane flex, ABD passive      x10 ea x10 ea      Hnd held AA Flexion       x10                   Bike?                                                     Ther Activity                                       Gait Training                                       Modalities             CP     5'

## 2022-08-11 ENCOUNTER — OFFICE VISIT (OUTPATIENT)
Dept: PHYSICAL THERAPY | Facility: CLINIC | Age: 72
End: 2022-08-11
Payer: COMMERCIAL

## 2022-08-11 DIAGNOSIS — S42.202A CLOSED TRAUMATIC DISPLACED FRACTURE OF PROXIMAL END OF LEFT HUMERUS, INITIAL ENCOUNTER: ICD-10-CM

## 2022-08-11 DIAGNOSIS — M25.512 CHRONIC LEFT SHOULDER PAIN: Primary | ICD-10-CM

## 2022-08-11 DIAGNOSIS — G89.29 CHRONIC LEFT SHOULDER PAIN: Primary | ICD-10-CM

## 2022-08-11 PROCEDURE — 97110 THERAPEUTIC EXERCISES: CPT | Performed by: PHYSICAL THERAPIST

## 2022-08-11 PROCEDURE — 97140 MANUAL THERAPY 1/> REGIONS: CPT | Performed by: PHYSICAL THERAPIST

## 2022-08-11 NOTE — PROGRESS NOTES
Daily Note     Today's date: 2022  Patient name: Peter Do  : 1950  MRN: [id number]  Referring provider: Briana García  Dx:   Encounter Diagnosis     ICD-10-CM    1  Chronic left shoulder pain  M25 512     G89 29    2  Closed traumatic displaced fracture of proximal end of left humerus, initial encounter  S42 A        Start Time: 1708          Subjective: Arm is feeling good and she is noticing improvements daily  Has been doing AAROM shoulder flexion supported by the L arm before she does pulley's and is able to get considerable flexion ROM this way  Objective: See treatment diary below      Assessment: Favorable to AAROM flexion prior to pulleys as mentioned  Instructed pt on how to use her cane for this and she was able to work her way up to 130 degrees AAROM flexion with the wand without adverse effects  Added shoulder flexion and abduction arom  Flexion she was able to reach about 30 deg and abduction 5-10 deg  Both motions were facilitated with considerable R side bending and shoulder shrugging compensations  There is concern for the excessive abduction weakness as shoulder abd PROM able to achieve 90deg, denoting the movement issue to be muscular vs joint restriction  Tolerated treatment well  Patient demonstrated fatigue post treatment, exhibited good technique with therapeutic exercises and would benefit from continued PT      Plan: Continue per plan of care  Progress treatment as tolerated  Update HEP with wantatiana AAROM, arom     Precautions: h/o L proximal humeral head fx  DOI: 22  Protocol: d/c sling on 22; begin AAROM, AROM on 22, begin resistive exercise on 22  Other factors: conservative tx, will consider reverse TSA PRN  EPOC: 22      HEP:  Access Code: [email or web address]  URL: [email or web address]  Date: 2022  Prepared by: Leena Manzano    Exercises  · Towel Roll Squeeze - 20 reps  · Wrist AROM Flexion Extension - 20 reps  · Wrist AROM Radial Ulnar Deviation - 20 reps  · Seated Elbow Flexion and Extension AROM - 20 reps  · Seated Forearm Pronation and Supination AROM - 20 reps  · Seated Shoulder Shrugs - 20 reps  · Circular Shoulder Pendulum with Table Support - 20 reps      Access Code: [email or web address]  URL: [email or web address]  Date: 07/28/2022  Prepared by: Jonah Soriano    Exercises  · Standing Isometric Shoulder Flexion with Doorway - Arm Bent - 10 reps - 5 sec hold  · Standing Isometric Shoulder Abduction with Doorway - Arm Bent - 10 reps - 5 hold  · Standing Isometric Shoulder Extension with Doorway - Arm Bent - 10 reps - 5 hold  · Isometric Shoulder Adduction - 10 reps - 5 hold                  Manuals 7/12 7/19 7/21 7/26 7/28 8/4 8/9 8/11     R elbow PROM  WE WE WE         R sh' PROM  WE WE WE DANNI WE WE DANNI     RUE, sh' STM DANNI WE WE WE         RC iso flex/ext, ER/IR, abd/add     5"x10 WE WE                                             Neuro Re-Ed                                                                                                        Ther Ex             Pulleys    3 min  flx 4' flex 4' flx 5 min flx 4' flex     Pendulums 1' 1' 1' 1'  1' 1' 1'     Cervical arom   x10 ea x20 ea  20       Wrist arom x20 x20 x20 x20         Elbow flex/ext x20 x20 x20 x20   1# x20      Elbow sup/pron x20 x20 x20 x20         Shoulder shrug x20 x20 x20 x20         scap rtrxn   x20 x20         Towel squeeze x20 x20 x20 x20   20      Table slides  x10 ea x20 ea x20 ea  20 x20 ea x20     Cane flex, ABD passive      x10 ea x10 ea      Hnd held AA Flexion       x10 Wand x20     Sh' flex arom        x10     Sh' abd arom        x10                                            Ther Activity                                       Gait Training                                       Modalities             CP     5'

## 2022-08-16 ENCOUNTER — OFFICE VISIT (OUTPATIENT)
Dept: PHYSICAL THERAPY | Facility: CLINIC | Age: 72
End: 2022-08-16
Payer: COMMERCIAL

## 2022-08-16 DIAGNOSIS — G89.29 CHRONIC LEFT SHOULDER PAIN: Primary | ICD-10-CM

## 2022-08-16 DIAGNOSIS — S42.202A CLOSED TRAUMATIC DISPLACED FRACTURE OF PROXIMAL END OF LEFT HUMERUS, INITIAL ENCOUNTER: ICD-10-CM

## 2022-08-16 DIAGNOSIS — M25.512 CHRONIC LEFT SHOULDER PAIN: Primary | ICD-10-CM

## 2022-08-16 PROCEDURE — 97140 MANUAL THERAPY 1/> REGIONS: CPT | Performed by: PHYSICAL THERAPIST

## 2022-08-16 PROCEDURE — 97110 THERAPEUTIC EXERCISES: CPT | Performed by: PHYSICAL THERAPIST

## 2022-08-16 NOTE — PROGRESS NOTES
Daily Note     Today's date: 2022  Patient name: Sully Salinas  : 1950  MRN: [id number]  Referring provider: Abimbola Carrasquillo*  Dx:   Encounter Diagnosis     ICD-10-CM    1  Chronic left shoulder pain  M25 512     G89 29    2  Closed traumatic displaced fracture of proximal end of left humerus, initial encounter  S42         Start Time: 1700          Subjective: A little sore today from exercises performed at home  Objective: See treatment diary below      Assessment: Started with MHP due to increased soreness  Favorable  Tendency for bent over row compensation with trunk rotation, corrected with tactile cues  Shoulder abduction arom still poor  Updated HEP with AROM and AAROM exercises  Tolerated treatment well  Patient demonstrated fatigue post treatment, exhibited good technique with therapeutic exercises and would benefit from continued PT      Plan: Continue per plan of care  Progress treatment as tolerated  Precautions: h/o L proximal humeral head fx  DOI: 22  Protocol: d/c sling on 22; begin AAROM, AROM on 22, begin resistive exercise on 22  Other factors: conservative tx, will consider reverse TSA PRN  EPOC: 22      HEP:  Access Code: [email or web address]  URL: [email or web address]  Date: 2022  Prepared by: Jocelin Baum    Exercises  · Shoulder Flexion Overhead with Dowel - 20 reps  · Seated Shoulder Flexion AAROM with Pulley Behind - 5 min hold  · Circular Shoulder Pendulum with Table Support - 1 min hold  · Standing Bent Over Single Arm Scapular Row with Table Support - 20 reps  · Standing Shoulder Flexion Full Range - 20 reps  · Standing Shoulder Abduction Full Range - 20 reps  · Bicep curls supinated - 20 reps          Manuals     R elbow PROM  WE WE WE         R sh' PROM  WE WE WE [address] WE WE [address] DANNI    RUE, sh' STM DANNI WE WE WE         RC iso flex/ext, ER/IR, abd/add     5"x10 WE WE Neuro Re-Ed                                                                                                        Ther Ex             Pulleys    3 min  flx 4' flex 4' flx 5 min flx 4' flex 4'    Pendulums 1' 1' 1' 1'  1' 1' 1' 1'    Cervical arom   x10 ea x20 ea  20       Wrist arom x20 x20 x20 x20         Elbow flex/ext x20 x20 x20 x20   1# x20  2# x20    Elbow sup/pron x20 x20 x20 x20         Shoulder shrug x20 x20 x20 x20         scap rtrxn   x20 x20         Towel squeeze x20 x20 x20 x20   20      Table slides  x10 ea x20 ea x20 ea  20 x20 ea x20     Cane flex, ABD passive      x10 ea x10 ea      Hnd held AA Flexion       x10 Wand x20 Wand x20    Sh' flex arom        x10 x20    Sh' abd arom        x10 x20    Bent over row         x20                              Ther Activity                                       Gait Training                                       Modalities             CP     5'        MHP         Begin 10'

## 2022-08-18 ENCOUNTER — APPOINTMENT (OUTPATIENT)
Dept: PHYSICAL THERAPY | Facility: CLINIC | Age: 72
End: 2022-08-18
Payer: COMMERCIAL

## 2022-08-23 ENCOUNTER — OFFICE VISIT (OUTPATIENT)
Dept: PHYSICAL THERAPY | Facility: CLINIC | Age: 72
End: 2022-08-23
Payer: COMMERCIAL

## 2022-08-23 DIAGNOSIS — G89.29 CHRONIC LEFT SHOULDER PAIN: Primary | ICD-10-CM

## 2022-08-23 DIAGNOSIS — S42.202A CLOSED TRAUMATIC DISPLACED FRACTURE OF PROXIMAL END OF LEFT HUMERUS, INITIAL ENCOUNTER: ICD-10-CM

## 2022-08-23 DIAGNOSIS — M25.512 CHRONIC LEFT SHOULDER PAIN: Primary | ICD-10-CM

## 2022-08-23 PROCEDURE — 97110 THERAPEUTIC EXERCISES: CPT

## 2022-08-23 PROCEDURE — 97112 NEUROMUSCULAR REEDUCATION: CPT

## 2022-08-23 PROCEDURE — 97140 MANUAL THERAPY 1/> REGIONS: CPT

## 2022-08-25 ENCOUNTER — APPOINTMENT (OUTPATIENT)
Dept: PHYSICAL THERAPY | Facility: CLINIC | Age: 72
End: 2022-08-25
Payer: COMMERCIAL

## 2022-08-30 ENCOUNTER — APPOINTMENT (OUTPATIENT)
Dept: PHYSICAL THERAPY | Facility: CLINIC | Age: 72
End: 2022-08-30
Payer: COMMERCIAL

## 2022-09-01 ENCOUNTER — OFFICE VISIT (OUTPATIENT)
Dept: PHYSICAL THERAPY | Facility: CLINIC | Age: 72
End: 2022-09-01
Payer: COMMERCIAL

## 2022-09-01 DIAGNOSIS — S42.202A CLOSED TRAUMATIC DISPLACED FRACTURE OF PROXIMAL END OF LEFT HUMERUS, INITIAL ENCOUNTER: ICD-10-CM

## 2022-09-01 DIAGNOSIS — G89.29 CHRONIC LEFT SHOULDER PAIN: Primary | ICD-10-CM

## 2022-09-01 DIAGNOSIS — M25.512 CHRONIC LEFT SHOULDER PAIN: Primary | ICD-10-CM

## 2022-09-01 PROCEDURE — 97140 MANUAL THERAPY 1/> REGIONS: CPT | Performed by: PHYSICAL THERAPIST

## 2022-09-01 PROCEDURE — 97110 THERAPEUTIC EXERCISES: CPT | Performed by: PHYSICAL THERAPIST

## 2022-09-01 NOTE — PROGRESS NOTES
Daily Note     Today's date: 2022  Patient name: Bea Thakkar  : 1950  MRN: [id number]  Referring provider: Sharon Givens*  Dx:   Encounter Diagnosis     ICD-10-CM    1  Chronic left shoulder pain  M25 512     G89 29    2  Closed traumatic displaced fracture of proximal end of left humerus, initial encounter  S4                   Subjective: Patient stated minimal pain prior to treatment session  Objective: See treatment diary below      Assessment: Patient required verbal cueing to avoid compensation with AROM flexion and abduction; minimal pain with manual shoulder PROM  Plan: Continue per plan of care  Progress treatment as tolerated  Precautions: h/o L proximal humeral head fx  DOI: 22  Protocol: d/c sling on 22; begin AAROM, AROM on 22, begin resistive exercise on 22  Other factors: conservative tx, will consider reverse TSA PRN  EPOC: 22      HEP:  Access Code: [email or web address]  URL: [email or web address]/  Date: 2022  Prepared by: Zeferino Corinna    Exercises  · Shoulder Flexion Overhead with Dowel - 20 reps  · Seated Shoulder Flexion AAROM with Pulley Behind - 5 min hold  · Circular Shoulder Pendulum with Table Support - 1 min hold  · Standing Bent Over Single Arm Scapular Row with Table Support - 20 reps  · Standing Shoulder Flexion Full Range - 20 reps  · Standing Shoulder Abduction Full Range - 20 reps  · Bicep curls supinated - 20 reps          Manuals    R elbow PROM  WE WE WE         R sh' PROM KK WE WE WE [address] WE WE [address] WE   RUE, sh' STM  WE WE WE         RC iso flex/ext, ER/IR, abd/add     5"x10 WE WE                                             Neuro Re-Ed                                                                                                        Ther Ex             Pulleys 3'/2'   3 min  flx 4' flex 4' flx 5 min flx 4' flex 4' 3'/2'   Pendulums 2#  1 min Pulmonology 1' 1' 1'  1' 1' 1' 1' 2#  1 min   Cervical arom   x10 ea x20 ea  20       Wrist arom  x20 x20 x20         Elbow flex/ext  x20 x20 x20   1# x20  2# x20 np   Elbow sup/pron  x20 x20 x20         Shoulder shrug  x20 x20 x20         scap rtrxn   x20 x20         Towel squeeze  x20 x20 x20   20      Table slides  x10 ea x20 ea x20 ea  20 x20 ea x20     Cane flex, ABD passive      x10 ea x10 ea      Hnd held AA Flexion Wand x20      x10 Wand x20 Wand x20 Wand x20   Sh' flex arom x20       x10 x20 x20   Sh' abd arom x20       x10 x20 x20   Bent over row 1# x20        x20 1#  x20                             Ther Activity                                       Gait Training                                       Modalities             CP     5'        MHP         Begin 8'

## 2022-09-06 ENCOUNTER — APPOINTMENT (OUTPATIENT)
Dept: PHYSICAL THERAPY | Facility: CLINIC | Age: 72
End: 2022-09-06
Payer: COMMERCIAL

## 2022-09-08 ENCOUNTER — APPOINTMENT (OUTPATIENT)
Dept: PHYSICAL THERAPY | Facility: CLINIC | Age: 72
End: 2022-09-08
Payer: COMMERCIAL

## 2022-09-13 ENCOUNTER — OFFICE VISIT (OUTPATIENT)
Dept: PHYSICAL THERAPY | Facility: CLINIC | Age: 72
End: 2022-09-13
Payer: COMMERCIAL

## 2022-09-13 DIAGNOSIS — S42.202A CLOSED TRAUMATIC DISPLACED FRACTURE OF PROXIMAL END OF LEFT HUMERUS, INITIAL ENCOUNTER: ICD-10-CM

## 2022-09-13 DIAGNOSIS — G89.29 CHRONIC LEFT SHOULDER PAIN: Primary | ICD-10-CM

## 2022-09-13 DIAGNOSIS — M25.512 CHRONIC LEFT SHOULDER PAIN: Primary | ICD-10-CM

## 2022-09-13 PROCEDURE — 97110 THERAPEUTIC EXERCISES: CPT

## 2022-09-13 PROCEDURE — 97140 MANUAL THERAPY 1/> REGIONS: CPT

## 2022-09-13 PROCEDURE — 97112 NEUROMUSCULAR REEDUCATION: CPT

## 2022-09-13 NOTE — PROGRESS NOTES
Daily Note     Today's date: 2022  Patient name: Ren Wells  : 1950  MRN: [id number]  Referring provider: Rhiannon Aguayo*  Dx:   Encounter Diagnosis     ICD-10-CM    1  Chronic left shoulder pain  M25 512     G89 29    2  Closed traumatic displaced fracture of proximal end of left humerus, initial encounter  S42                    Subjective: Patient reports she has been doing well and continuing to see progress daily      Objective: See treatment diary below      Assessment: Patient continues to progress well  Transitioned to AROM in supine and side lying with good tolerance  Still shows the most restriction with IR but all other motions are moving well  Introduced UBE today with good tolerance  Progressed HEP to work on AROM and stretching IR  Plan: Continue per plan of care  Progress treatment as tolerated  Precautions: h/o L proximal humeral head fx  DOI: 22  Protocol: d/c sling on 22; begin AAROM, AROM on 22, begin resistive exercise on 22  Other factors: conservative tx, will consider reverse TSA PRN  EPOC: 22      HEP:  Access Code: [email or web address]  URL: [email or web address]/  Date: 2022  Prepared by: Jaydon Feng    Exercises  · Shoulder Flexion Overhead with Dowel - 20 reps  · Seated Shoulder Flexion AAROM with Pulley Behind - 5 min hold  · Circular Shoulder Pendulum with Table Support - 1 min hold  · Standing Bent Over Single Arm Scapular Row with Table Support - 20 reps  · Standing Shoulder Flexion Full Range - 20 reps  · Standing Shoulder Abduction Full Range - 20 reps  · Bicep curls supinated - 20 reps          Manuals    R elbow PROM             R sh' PROM KK     WE WE [address] DANNI WE   RUE, sh' STM             RC iso flex/ext, ER/IR, abd/add      WE WE                                             Neuro Re-Ed Ther Ex             Pulleys 3'/2' 2'/2'    4' flx 5 min flx 4' flex 4' 3'/2'   UBE  2'/2'           Pendulums 2#  1 min     1' 1' 1' 1' 2#  1 min   Cervical arom      20       Wrist arom             Elbow flex/ext       1# x20  2# x20 np   Elbow sup/pron             Shoulder shrug             scap rtrxn             Towel squeeze       20      Table slides      20 x20 ea x20     Cane Ext and FIR  x20 ea           Cane flex, ABD passive      x10 ea x10 ea      Hnd held AA Flexion Wand x20      x10 Wand x20 Wand x20 Wand x20   Sh' flex arom x20 Supine 20      x10 x20 x20   Sh' abd arom x20 SL 20      x10 x20 x20   Sh' ER  SL 20           Bent over row 1# x20 1#x20       x20 1#  x20                             Ther Activity                                       Gait Training                                       Modalities             CP             MHP         Begin 10'

## 2022-09-15 ENCOUNTER — APPOINTMENT (OUTPATIENT)
Dept: RADIOLOGY | Facility: OTHER | Age: 72
End: 2022-09-15
Payer: COMMERCIAL

## 2022-09-15 ENCOUNTER — OFFICE VISIT (OUTPATIENT)
Dept: OBGYN CLINIC | Facility: OTHER | Age: 72
End: 2022-09-15
Payer: COMMERCIAL

## 2022-09-15 VITALS
WEIGHT: 188 LBS | HEART RATE: 76 BPM | BODY MASS INDEX: 31.32 KG/M2 | SYSTOLIC BLOOD PRESSURE: 146 MMHG | HEIGHT: 65 IN | DIASTOLIC BLOOD PRESSURE: 83 MMHG

## 2022-09-15 DIAGNOSIS — S42.202D CLOSED TRAUMATIC DISPLACED FRACTURE OF PROXIMAL END OF LEFT HUMERUS WITH ROUTINE HEALING, SUBSEQUENT ENCOUNTER: Primary | ICD-10-CM

## 2022-09-15 DIAGNOSIS — S42.202A CLOSED TRAUMATIC DISPLACED FRACTURE OF PROXIMAL END OF LEFT HUMERUS, INITIAL ENCOUNTER: ICD-10-CM

## 2022-09-15 PROBLEM — S42.209D: Status: ACTIVE | Noted: 2022-06-20

## 2022-09-15 PROCEDURE — 73030 X-RAY EXAM OF SHOULDER: CPT

## 2022-09-15 PROCEDURE — 99213 OFFICE O/P EST LOW 20 MIN: CPT | Performed by: ORTHOPAEDIC SURGERY

## 2022-09-15 NOTE — PROGRESS NOTES
Assessment  Diagnoses and all orders for this visit:    Closed traumatic displaced fracture of proximal end of left humerus with routine healing, subsequent encounter    Resolving deltoid atony left shoulder    Discussion and Plan:    · X-rays today show progressive healing of fracture  · Deltoid atony is recovering on exam today, no need to order EMG  · Continue physical therapy and wean to HEP when ready  · Activities as tolerated  · Follow up as needed  Subjective:   Patient ID: Michael Garcia is a 73 y o  female      HPI  Patient presents today  s/p left proximal humerus fracture sustained on 6/18/2022  Patient did develop deltoid atony from the injury  Patient is attending PT as instructed  She reports she is very pleased with her progress  The following portions of the patient's history were reviewed and updated as appropriate: allergies, current medications, past family history, past medical history, past social history, past surgical history and problem list     Review of Systems   Constitutional: Negative for chills and fever  HENT: Negative for drooling and sneezing  Eyes: Negative for redness  Respiratory: Negative for cough and wheezing  Gastrointestinal: Negative for nausea and vomiting  Musculoskeletal:        Please see ortho exam   Psychiatric/Behavioral: Negative for behavioral problems  The patient is not nervous/anxious  Objective:  /83 (BP Location: Left arm, Patient Position: Sitting, Cuff Size: Standard)   Pulse 76   Ht 5' 5" (1 651 m)   Wt 85 3 kg (188 lb)   BMI 31 28 kg/m²       Left Shoulder Exam     Tenderness   The patient is experiencing no tenderness  Range of Motion   Forward flexion: 70   Left shoulder internal rotation 0 degrees: buttocks  Other   Erythema: absent  Sensation: normal  Pulse: present     Comments:    Recovering deltoid atony            Physical Exam  Vitals reviewed     Constitutional:       Appearance: She is well-developed  Eyes:      Pupils: Pupils are equal, round, and reactive to light  Pulmonary:      Effort: Pulmonary effort is normal       Breath sounds: Normal breath sounds  Abdominal:      General: Abdomen is flat  There is no distension  Skin:     General: Skin is warm and dry  Neurological:      Mental Status: She is alert and oriented to person, place, and time  Psychiatric:         Behavior: Behavior normal          Thought Content: Thought content normal          Judgment: Judgment normal            I have personally reviewed pertinent films in PACS and my interpretation is as follows  Left shoulder x-rays demonstrates progressive healing of proximal humerus fracture       Scribe Attestation    I,:  Aiden Lozano am acting as a scribe while in the presence of the attending physician :       I,:  Janay Wiggins MD personally performed the services described in this documentation    as scribed in my presence :

## 2022-09-20 ENCOUNTER — OFFICE VISIT (OUTPATIENT)
Dept: PHYSICAL THERAPY | Facility: CLINIC | Age: 72
End: 2022-09-20
Payer: COMMERCIAL

## 2022-09-20 DIAGNOSIS — S42.202A CLOSED TRAUMATIC DISPLACED FRACTURE OF PROXIMAL END OF LEFT HUMERUS, INITIAL ENCOUNTER: ICD-10-CM

## 2022-09-20 DIAGNOSIS — G89.29 CHRONIC LEFT SHOULDER PAIN: Primary | ICD-10-CM

## 2022-09-20 DIAGNOSIS — M25.512 CHRONIC LEFT SHOULDER PAIN: Primary | ICD-10-CM

## 2022-09-20 PROCEDURE — 97112 NEUROMUSCULAR REEDUCATION: CPT

## 2022-09-20 PROCEDURE — 97140 MANUAL THERAPY 1/> REGIONS: CPT

## 2022-09-20 PROCEDURE — 97110 THERAPEUTIC EXERCISES: CPT

## 2022-09-20 NOTE — PROGRESS NOTES
Daily Note     Today's date: 2022  Patient name: Bea Thakkar  : 1950  MRN: [id number]  Referring provider: Sharon Givens*  Dx:   Encounter Diagnosis     ICD-10-CM    1  Chronic left shoulder pain  M25 512     G89 29    2  Closed traumatic displaced fracture of proximal end of left humerus, initial encounter  S42                    Subjective: Patient reports she has been doing well and continuing to see progress daily      Objective: See treatment diary below      Assessment: Patient continues to progress well  AROM is coming along well  Continued to progress with a good understanding of an extensive HEP that she performs several times per day  She is in good spirits and understands that her arm may structurally never be normal again but wants to progress to gain as much function as possible before ever entertaining the thought of any surgery  Plan: Continue per plan of care  Progress treatment as tolerated  Precautions: h/o L proximal humeral head fx  DOI: 22  Protocol: d/c sling on 22; begin AAROM, AROM on 22, begin resistive exercise on 22  Other factors: conservative tx, will consider reverse TSA PRN  EPOC: 22      HEP:  Access Code: [email or web address]  URL: [email or web address]  Date: 2022  Prepared by: Zeferino Cummins    Exercises  · Shoulder Flexion Overhead with Dowel - 20 reps  · Seated Shoulder Flexion AAROM with Pulley Behind - 5 min hold  · Circular Shoulder Pendulum with Table Support - 1 min hold  · Standing Bent Over Single Arm Scapular Row with Table Support - 20 reps  · Standing Shoulder Flexion Full Range - 20 reps  · Standing Shoulder Abduction Full Range - 20 reps  · Bicep curls supinated - 20 reps          Manuals    R elbow PROM   WE          R sh' PROM KK  WE   WE WE [address] DANNI WE   RUE, sh' STM   WE          RC iso flex/ext, ER/IR, abd/add   WE   WE WE Neuro Re-Ed                                                                                                        Ther Ex             Pulleys 3'/2' 2'/2'    4' flx 5 min flx 4' flex 4' 3'/2'   UBE  2'/2' 3'/3'          Pendulums 2#  1 min     1' 1' 1' 1' 2#  1 min   Cervical arom      20       Wrist arom             Elbow flex/ext       1# x20  2# x20 np   Elbow sup/pron             Shoulder shrug             scap rtrxn             Towel squeeze       20      Table slides      20 x20 ea x20     Cane Ext and FIR  x20 ea x20          Cane flex, ABD passive      x10 ea x10 ea      Hnd held AA Flexion Wand x20      x10 Wand x20 Wand x20 Wand x20   Sh' flex arom x20 Supine 20 Supine  x20     x10 x20 x20   Sh' abd arom x20 SL 20 SL 20     x10 x20 x20   Sh' ER  SL 20 SL 20          Bent over row 1# x20 1#x20 1# 20      x20 1#  x20                             Ther Activity                                       Gait Training                                       Modalities             CP             MHP         Begin 10'

## 2022-09-22 ENCOUNTER — APPOINTMENT (OUTPATIENT)
Dept: PHYSICAL THERAPY | Facility: CLINIC | Age: 72
End: 2022-09-22
Payer: COMMERCIAL

## 2022-09-27 ENCOUNTER — OFFICE VISIT (OUTPATIENT)
Dept: PHYSICAL THERAPY | Facility: CLINIC | Age: 72
End: 2022-09-27
Payer: COMMERCIAL

## 2022-09-27 DIAGNOSIS — M25.512 CHRONIC LEFT SHOULDER PAIN: Primary | ICD-10-CM

## 2022-09-27 DIAGNOSIS — G89.29 CHRONIC LEFT SHOULDER PAIN: Primary | ICD-10-CM

## 2022-09-27 DIAGNOSIS — S42.202A CLOSED TRAUMATIC DISPLACED FRACTURE OF PROXIMAL END OF LEFT HUMERUS, INITIAL ENCOUNTER: ICD-10-CM

## 2022-09-27 PROCEDURE — 97140 MANUAL THERAPY 1/> REGIONS: CPT

## 2022-09-27 PROCEDURE — 97110 THERAPEUTIC EXERCISES: CPT

## 2022-09-27 PROCEDURE — 97112 NEUROMUSCULAR REEDUCATION: CPT

## 2022-09-27 NOTE — PROGRESS NOTES
Daily Note     Today's date: 2022  Patient name: Maribell Haynes  : 1950  MRN: [id number]  Referring provider: Jemima Kramer*  Dx:   Encounter Diagnosis     ICD-10-CM    1  Chronic left shoulder pain  M25 512     G89 29    2  Closed traumatic displaced fracture of proximal end of left humerus, initial encounter  S42                    Subjective: Patient reports she has been doing well and continuing to see progress daily      Objective: See treatment diary below      Assessment: Patient continues to progress well with PROM and AROM  Progressed to working on TB resisted exercises with good tolerance  Continued to progress with a good understanding of an extensive HEP that she performs several times per day  She is in good spirits and understands that her arm may structurally never be normal again but wants to progress to gain as much function as possible before ever entertaining the thought of any surgery  Plan: Continue per plan of care  Progress treatment as tolerated  Precautions: h/o L proximal humeral head fx  DOI: 22  Protocol: d/c sling on 22; begin AAROM, AROM on 22, begin resistive exercise on 22  Other factors: conservative tx, will consider reverse TSA PRN  EPOC: 22      HEP:  Access Code: [email or web address]  URL: [email or web address]  Date: 2022  Prepared by: Peter Tolentino    Exercises  · Shoulder Flexion Overhead with Dowel - 20 reps  · Seated Shoulder Flexion AAROM with Pulley Behind - 5 min hold  · Circular Shoulder Pendulum with Table Support - 1 min hold  · Standing Bent Over Single Arm Scapular Row with Table Support - 20 reps  · Standing Shoulder Flexion Full Range - 20 reps  · Standing Shoulder Abduction Full Range - 20 reps  · Bicep curls supinated - 20 reps          Manuals    R elbow PROM   WE KIRILL DAMON ' PROM KK  WE KIRILL     [facility], ' STM   WE          RC iso flex/ext, ER/IR, abd/add   WE                                                 Neuro Re-Ed                                                                                                        Ther Ex             Pulleys 3'/2' 2'/2'  2'/2'     4' 3'/2'   UBE  2'/2' 3'/3' 4'/4'         Pendulums 2#  1 min        1' 2#  1 min   Cervical arom             Wrist arom             Elbow flex/ext         2# x20 np   Elbow sup/pron             Shoulder shrug             scap rtrxn             Towel squeeze             Table slides             Cane Ext and FIR  x20 ea x20 x20 ea         Cane flex, ABD passive             Hnd held AA Flexion Wand x20        Wand x20 Wand x20   Sh' flex arom x20 Supine 20 Supine  x20 Supine  x20     x20 x20   Sh' abd arom x20 SL 20 SL 20 SL 20     x20 x20   Sh' ER  SL 20 SL 20 SL 20         Bent over row 1# x20 1#x20 1# 20 2# x20     x20 1#  x20   TB Rows    YTB x10         TB Ext    YTB  x10         Ther Activity                                       Gait Training                                       Modalities             CP             MHP         Begin 10'

## 2022-09-29 ENCOUNTER — EVALUATION (OUTPATIENT)
Dept: PHYSICAL THERAPY | Facility: CLINIC | Age: 72
End: 2022-09-29
Payer: COMMERCIAL

## 2022-09-29 DIAGNOSIS — G89.29 CHRONIC LEFT SHOULDER PAIN: Primary | ICD-10-CM

## 2022-09-29 DIAGNOSIS — S42.202A CLOSED TRAUMATIC DISPLACED FRACTURE OF PROXIMAL END OF LEFT HUMERUS, INITIAL ENCOUNTER: ICD-10-CM

## 2022-09-29 DIAGNOSIS — M25.512 CHRONIC LEFT SHOULDER PAIN: Primary | ICD-10-CM

## 2022-09-29 PROCEDURE — 97110 THERAPEUTIC EXERCISES: CPT | Performed by: PHYSICAL THERAPIST

## 2022-09-29 PROCEDURE — 97140 MANUAL THERAPY 1/> REGIONS: CPT | Performed by: PHYSICAL THERAPIST

## 2022-09-29 NOTE — PROGRESS NOTES
PT Re-Evaluation     Today's date: 2022  Patient name: Sammie Muhammad  : 1950  MRN: [id number]  Referring provider: Arun Brothers*  Dx:   Encounter Diagnosis     ICD-10-CM    1  Chronic left shoulder pain  M25 512     G89 29    2  Closed traumatic displaced fracture of proximal end of left humerus, initial encounter  S4                   Assessment  Assessment details: Sammie Muhammad is a 73 y o  female presenting to outpatient physical therapy at [place] [address] with complaints of L shoulder pain s/p L proximal humerus fx on 22   They present with decreased range of motion, decreased strength, limited flexibility, poor postural awareness, poor body mechanics, poor balance, decreased tolerance to activity and decreased functional mobility due to Chronic left shoulder pain  (primary encounter diagnosis)  Closed traumatic displaced fracture of proximal end of left humerus, initial encounter  [facility] would benefit from skilled physical therapy to address noted impairments in order to allow for full functional return to work and ADL-related activities  Thank you for the referral!    RE: 22  Sammie Muhammad has attended physical therapy for 15 visits  In this time, they have made significant improvements in symptoms and function, as noted by subjective report, improved balance, ROM, strength, flexibility and activity tolerance  They have made positive goal progress with FOTO score improvement from 18 at initial evaluation to 52 currently  They do continue to have impairments in pain, ROM, strength, balance, flexibility and activity tolerance  Recommend continued skilled PT in order to maximize function      Impairments: abnormal muscle firing, abnormal or restricted ROM, activity intolerance, impaired balance, impaired physical strength, lacks appropriate home exercise program, pain with function and poor body mechanics  Barriers to therapy: n/a  Understanding of Dx/Px/POC: excellent  Goals  ST   Independent with HEP in 2 weeks - met  2  Decrease pain by 50% in 3 wks - met  3   Increase shoulder elevation AROM to 30 degrees in 3 weeks - met    LT  Achieve FOTO score of 52/100 in 6 weeks   2   Able to d/c the sling completely in 6 weeks - met  3  Strength = 4-/5 R shoulder all planes in 6 weeks - not met  4  Able to reach 75 deg shoulder elevation AROM in 6 weeks  - met    5  Able to achieve IR behind the back to level of L4 in 6 weeks  6  Able to achieve 90 deg AROM shoulder flexion in 6 weeks  Plan  Plan details: RE in 6 weeks  Patient would benefit from: skilled physical therapy  Planned modality interventions: cryotherapy, electrical stimulation/Russian stimulation and thermotherapy: hydrocollator packs  Planned therapy interventions: abdominal trunk stabilization, manual therapy, neuromuscular re-education, therapeutic activities, therapeutic exercise, body mechanics training and home exercise program  Frequency: 2x week  Plan of Care beginning date: 2022  Plan of Care expiration date: 2022  Treatment plan discussed with: patient        Subjective Evaluation    History of Present Illness  Mechanism of injury: Pt c/o L shoulder pain s/p L proximal humeral head fx due to a fall  DOI 22 (3 5 weeks ago)  Treated conservatively with sling  Plan is to continue with conservative tx and will consider reverse total shoulder replacement if lack of positive results  During f/u with orthopedics on 22, pt was advised to discontinue sling use after  and then begin AAROM and AROM exercises  She is allowed to begin resistive exercise on 22  She reports compliance with her restrictions, though continues to wear the sling due to not feeling ready  She does not sleep with the sling on  Pain levels are 3/10 on avg  She takes tylenol around the clock  Positive for tingling in the L hand      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  She does not work  In her free time she enjoyed gardening, dance-walking exercising for 40 mins to an hour every other day and housework  Her driving is limited because of the tumor and she is not currently driving  She currently is quite restricted in her ADLs and hobbies secondary to the arm pain  She is becoming more independent with bathing, dressing, though requires help with the bra and shirt  She does not cook  She is having increased dizziness/unsteadiness while walking and standing, general body weakness (worse in the AM)  She continues to have flashbacks of the injury and expresses anxieties surrounding current state  RE: 9/29/22  Pt reports making significant progress since starting therapy  She has been able to d/c her sling, have greater shoulder range of motion, greater shoulder strength, more confidence with walking and doing ADLs independently  She is compliant with her HEP and all mobility work  Pain is very rare  She is still unable to lie on the L side secondary to discomfort and inability to push off with the L arm  She would like to be able to lift her arm up to the side better  She'd also like to be able to lift her arm behind her back for washing and dressing  She would like to continue therapy  Most recent shoulder xray from 9/15 shows "Stable alignment of healing displaced comminuted left humeral head and neck fracture"  She was discharged from orthopedics and will f/u PRN        Patient Goals  Patient goals for therapy: decreased edema, decreased pain, improved balance, increased motion, return to work, return to [employer] activities, independence with ADLs/IADLs and increased strength          Objective     Postural Observations    Additional Postural Observation Details  L arm at rest unguarded, she talks with the use of her L hand for gesturing without hesitation    Tenderness     Additional Tenderness Details  TTP L bicep, posterior shoulder    (+) sulcus sign  (+) abd lag    Cervical/Thoracic Screen   Cervical range of motion within normal limits    Active Range of Motion   Left Shoulder   Flexion: 85 degrees   Extension: 21 degrees   Abduction: 25 degrees   External rotation 0°: 20 degrees   External rotation BTH: C2   Internal rotation 0°: 0 degrees   Internal rotation BTB: sacrum     Additional Active Range of Motion Details  Wrist arom WFL all directions  Hand arom WFL all directions  Elbow flex WFL  Elbow ext WFL    Passive Range of Motion   Left Shoulder   Flexion: 122 degrees   Abduction: 70 degrees   External rotation 45°: 22 degrees   Internal rotation 45°: 55 degrees     Strength/Myotome Testing     Left Shoulder     Planes of Motion   Flexion: 3   Extension: 3+   Abduction: 0   Adduction: 3+   External rotation at 0°: 3-   Internal rotation at 0°: 3     Isolated Muscles   Biceps: 3+   Triceps: 3+   Upper trapezius: 4+     Additional Strength Details  Unable to test             Precautions: h/o L proximal humeral head fx  DOI: 6/19/22  Protocol: d/c sling on 7/5/22; begin AAROM, AROM on 7/5/22, begin resistive exercise on 8/9/22  Other factors: conservative tx, will consider reverse TSA PRN  EPOC: 8/26/22      HEP:  Access Code: [email or web address]  URL: [email or web address]  Date: 08/16/2022  Prepared by: Sonu Flores    Exercises  · Shoulder Flexion Overhead with Dowel - 20 reps  · Seated Shoulder Flexion AAROM with Pulley Behind - 5 min hold  · Circular Shoulder Pendulum with Table Support - 1 min hold  · Standing Bent Over Single Arm Scapular Row with Table Support - 20 reps  · Standing Shoulder Flexion Full Range - 20 reps  · Standing Shoulder Abduction Full Range - 20 reps  · Bicep curls supinated - 20 reps          Manuals 9/1 9/13 9/20 9/27 9/29        RE     DANNI        R elbow PROM   WE WE         R sh' PROM KK  WE WE [facility]        GHJ PA glides     [address] [facility]        RUE, sh' STM   WE          RC iso flex/ext, ER/IR, abd/add   WE Neuro Re-Ed             Supine arm bar rot     1# to fatigue x2                                                                                      Ther Ex             Pulleys 3'/2' 2'/2'  2'/2'         UBE  2'/2' 3'/3' 4'/4' 3'/3'        Pendulums 2#  1 min            Cane Ext and FIR  x20 ea x20 x20 ea         Hnd held AA Flexion Wand x20            Sh' flex arom x20 Supine 20 Supine  x20 Supine  x20         Sh' abd arom x20 SL 20 SL 20 SL 20         Sh' ER  SL 20 SL 20 SL 20         Bent over row 1# x20 1#x20 1# 20 2# x20         TB Rows    YTB x10         TB Ext    YTB  x10         S/L sh' ER             S/L sh' flex     x10                                  Ther Activity                                       Gait Training                                       Modalities             CP             MHP

## 2022-10-06 ENCOUNTER — APPOINTMENT (OUTPATIENT)
Dept: PHYSICAL THERAPY | Facility: CLINIC | Age: 72
End: 2022-10-06

## 2022-10-13 ENCOUNTER — APPOINTMENT (OUTPATIENT)
Dept: PHYSICAL THERAPY | Facility: CLINIC | Age: 72
End: 2022-10-13

## 2022-10-20 ENCOUNTER — OFFICE VISIT (OUTPATIENT)
Dept: PHYSICAL THERAPY | Facility: CLINIC | Age: 72
End: 2022-10-20
Payer: COMMERCIAL

## 2022-10-20 DIAGNOSIS — M25.512 CHRONIC LEFT SHOULDER PAIN: Primary | ICD-10-CM

## 2022-10-20 DIAGNOSIS — G89.29 CHRONIC LEFT SHOULDER PAIN: Primary | ICD-10-CM

## 2022-10-20 DIAGNOSIS — S42.202A CLOSED TRAUMATIC DISPLACED FRACTURE OF PROXIMAL END OF LEFT HUMERUS, INITIAL ENCOUNTER: ICD-10-CM

## 2022-10-20 PROCEDURE — 97110 THERAPEUTIC EXERCISES: CPT

## 2022-10-20 PROCEDURE — 97140 MANUAL THERAPY 1/> REGIONS: CPT

## 2022-10-20 NOTE — PROGRESS NOTES
Daily Note     Today's date: 10/20/2022  Patient name: Cira Caruso  : 1950  MRN: [id number]  Referring provider: Ricki Spence*  Dx:   Encounter Diagnosis     ICD-10-CM    1  Chronic left shoulder pain  M25 512     G89 29    2  Closed traumatic displaced fracture of proximal end of left humerus, initial encounter  S4                   Subjective: Pt reports she is feeling better after having covid  Hasnt done her exercises much since then    Objective: See treatment diary below      Assessment: Pt tolerated session fairly well considering she was dealing with covid infection for the last 3 weeks and became very weak  Her ROM had decreased slightly but post manuals it was moving pretty good  Strength and active movement remain the same as before covid, she just fatigued quicker  Tolerated treatment well  Patient demonstrated fatigue post treatment, exhibited good technique with therapeutic exercises and would benefit from continued PT      Plan: Continue per plan of care  Progress treatment as tolerated  Precautions: h/o L proximal humeral head fx  DOI: 22  Protocol: d/c sling on 22; begin AAROM, AROM on 22, begin resistive exercise on 22  Other factors: conservative tx, will consider reverse TSA PRN  EPOC: 22      HEP:  Access Code: [email or web address]  URL: [email or web address]  Date: 2022  Prepared by: Christina Rushing    Exercises  · Shoulder Flexion Overhead with Dowel - 20 reps  · Seated Shoulder Flexion AAROM with Pulley Behind - 5 min hold  · Circular Shoulder Pendulum with Table Support - 1 min hold  · Standing Bent Over Single Arm Scapular Row with Table Support - 20 reps  · Standing Shoulder Flexion Full Range - 20 reps  · Standing Shoulder Abduction Full Range - 20 reps  · Bicep curls supinated - 20 reps          Manuals 9/1 9/13 9/20 9/27 9/29 10/20       RE     DANNI        R elbow PROM   WE KIRILL ROY       R sh' PROM [address] [place] [address] GHNATI jacobo     DANNI        RUE, sh' STM   WE          RC iso flex/ext, ER/IR, abd/add   WE                                                 Neuro Re-Ed             Supine arm bar rot     1# to fatigue x2 1# x5                                                                                     Ther Ex             Pulleys 3'/2' 2'/2'  2'/2'         UBE  2'/2' 3'/3' 4'/4' 3'/3' 3'/3'       Pendulums 2#  1 min            Cane Ext and FIR  x20 ea x20 x20 ea         Hnd held AA Flexion Wand x20            Sh' flex arom x20 Supine 20 Supine  x20 Supine  x20  Supine  x20       Sh' abd arom x20 SL 20 SL 20 SL 20  SL 20       Sh' ER  SL 20 SL 20 SL 20  SL 20       Bent over row 1# x20 1#x20 1# 20 2# x20  2# x20       TB Rows    YTB x10         TB Ext    YTB  x10         S/L sh' ER, ABD, Horz ABD      x10 ea       S/L sh' flex     x10                                  Ther Activity                                       Gait Training                                       Modalities             CP             MHP

## 2022-10-27 ENCOUNTER — OFFICE VISIT (OUTPATIENT)
Dept: PHYSICAL THERAPY | Facility: CLINIC | Age: 72
End: 2022-10-27
Payer: COMMERCIAL

## 2022-10-27 DIAGNOSIS — G89.29 CHRONIC LEFT SHOULDER PAIN: Primary | ICD-10-CM

## 2022-10-27 DIAGNOSIS — S42.202A CLOSED TRAUMATIC DISPLACED FRACTURE OF PROXIMAL END OF LEFT HUMERUS, INITIAL ENCOUNTER: ICD-10-CM

## 2022-10-27 DIAGNOSIS — M25.512 CHRONIC LEFT SHOULDER PAIN: Primary | ICD-10-CM

## 2022-10-27 PROCEDURE — 97140 MANUAL THERAPY 1/> REGIONS: CPT | Performed by: PHYSICAL THERAPIST

## 2022-10-27 PROCEDURE — 97110 THERAPEUTIC EXERCISES: CPT | Performed by: PHYSICAL THERAPIST

## 2022-10-27 PROCEDURE — 97112 NEUROMUSCULAR REEDUCATION: CPT | Performed by: PHYSICAL THERAPIST

## 2022-10-27 NOTE — PROGRESS NOTES
Daily Note     Today's date: 10/27/2022  Patient name: Wendy Hastings  : 1950  MRN: [id number]  Referring provider: Guero Matthews*  Dx:   Encounter Diagnosis     ICD-10-CM    1  Chronic left shoulder pain  M25 512     G89 29    2  Closed traumatic displaced fracture of proximal end of left humerus, initial encounter  S4A        Start Time: 1702          Subjective: Still recovering from [place]  Feels stiff and fatigues easily  Objective: See treatment diary below      Assessment: Pt was able to tolerate 10min warm up on the UBE and noted she could've gone for more  Her endurance is improving  Flexion ROM is good  Pec tightness today, likely from prolonged period of sickness  Loosened the pec with manual release and added supine flyes for active mobility work  Favorable  Tolerated treatment well  Patient demonstrated fatigue post treatment, exhibited good technique with therapeutic exercises and would benefit from continued PT      Plan: Continue per plan of care  Progress treatment as tolerated  Precautions: h/o L proximal humeral head fx  DOI: 22  Protocol: d/c sling on 22; begin AAROM, AROM on 22, begin resistive exercise on 22  Other factors: conservative tx, will consider reverse TSA PRN  EPOC: 22      HEP:  Access Code: [email or web address]  URL: [email or web address]  Date: 2022  Prepared by: Anton Stager    Exercises  · Shoulder Flexion Overhead with Dowel - 20 reps  · Seated Shoulder Flexion AAROM with Pulley Behind - 5 min hold  · Circular Shoulder Pendulum with Table Support - 1 min hold  · Standing Bent Over Single Arm Scapular Row with Table Support - 20 reps  · Standing Shoulder Flexion Full Range - 20 reps  · Standing Shoulder Abduction Full Range - 20 reps  · Bicep curls supinated - 20 reps          Manuals 9/1 9/13 9/20 9/27 9/29 10/20 10/27      RE     DANNI        R elbow PROM   WE WE  WE       R sh' PROM [address] [facility] [address] [place] [address] GHJ AISHWARYA jacobo     DANNI        RUE, sh' STM   WE    DANNI      RC iso flex/ext, ER/IR, abd/add   WE                                                 Neuro Re-Ed             Supine arm bar rot     1# to fatigue x2 1# x5                                                                                     Ther Ex             Pulleys 3'/2' 2'/2'  2'/2'   3'      UBE  2'/2' 3'/3' 4'/4' 3'/3' 3'/3' 5'/5'      Pendulums 2#  1 min            Cane Ext and FIR  x20 ea x20 x20 ea         Hnd held AA Flexion Wand x20            Sh' flex arom x20 Supine 20 Supine  x20 Supine  x20  Supine  x20 supine wand 1# x15      Sh' abd arom x20 SL 20 SL 20 SL 20  SL 20       Sh' ER  SL 20 SL 20 SL 20  SL 20       Bent over row 1# x20 1#x20 1# 20 2# x20  2# x20       TB Rows    YTB x10         TB Ext    YTB  x10         S/L sh' ER, ABD, Horz ABD      x10 ea       S/L sh' flex     x10        Supine flyes       x15                                                          Ther Activity                                       Gait Training                                       Modalities             CP             MHP

## 2022-11-03 ENCOUNTER — OFFICE VISIT (OUTPATIENT)
Dept: PHYSICAL THERAPY | Facility: CLINIC | Age: 72
End: 2022-11-03

## 2022-11-03 DIAGNOSIS — M25.512 CHRONIC LEFT SHOULDER PAIN: Primary | ICD-10-CM

## 2022-11-03 DIAGNOSIS — S42.202A CLOSED TRAUMATIC DISPLACED FRACTURE OF PROXIMAL END OF LEFT HUMERUS, INITIAL ENCOUNTER: ICD-10-CM

## 2022-11-03 DIAGNOSIS — G89.29 CHRONIC LEFT SHOULDER PAIN: Primary | ICD-10-CM

## 2022-11-03 NOTE — PROGRESS NOTES
Daily Note     Today's date: 11/3/2022  Patient name: Wilfredo Mora  : 1950  MRN: [id number]  Referring provider: Mansoor Pagan*  Dx:   Encounter Diagnosis     ICD-10-CM    1  Chronic left shoulder pain  M25 512     G89 29    2  Closed traumatic displaced fracture of proximal end of left humerus, initial encounter  S42                    Subjective: Pt is feeling good, almost back to herself after Covid, and shoulder is feels stronger  She started babysitting her 11 y o grandson  Objective: See treatment diary below      Assessment: Pt continues to improve her endurance with longer warm up- which she enjoys  Her PROM continues to improve but ER remains restricted  Also tolerated increased resistance with some TE's as noted below  Tolerated treatment well  Patient demonstrated fatigue post treatment, exhibited good technique with therapeutic exercises and would benefit from continued PT      Plan: Continue per plan of care  Progress treatment as tolerated  Precautions: h/o L proximal humeral head fx  DOI: 22  Protocol: d/c sling on 22; begin AAROM, AROM on 22, begin resistive exercise on 22  Other factors: conservative tx, will consider reverse TSA PRN  EPOC: 22      HEP:  Access Code: [email or web address]  URL: [email or web address]  Date: 2022  Prepared by: Piedad Torresdain    Exercises  · Shoulder Flexion Overhead with Dowel - 20 reps  · Seated Shoulder Flexion AAROM with Pulley Behind - 5 min hold  · Circular Shoulder Pendulum with Table Support - 1 min hold  · Standing Bent Over Single Arm Scapular Row with Table Support - 20 reps  · Standing Shoulder Flexion Full Range - 20 reps  · Standing Shoulder Abduction Full Range - 20 reps  · Bicep curls supinated - 20 reps          Manuals 9/1 9/13 9/20 9/27 9/29 10/20 10/27 11/3     RE     DANNI        R elbow PROM   WE WE  WE       R sh' PROM KK  WE WE [address] WE [address] WE     BRISSA jacobo     [address] RUE, sh' STM   WE    DANNI WE     RC iso flex/ext, ER/IR, abd/add   WE                                                 Neuro Re-Ed             Supine arm bar rot     1# to fatigue x2 1# x5  1# x10                                                                                   Ther Ex             Pulleys 3'/2' 2'/2'  2'/2'   3'      UBE  2'/2' 3'/3' 4'/4' 3'/3' 3'/3' 5'/5' 6'/6'     Pendulums 2#  1 min            Cane Ext and FIR  x20 ea x20 x20 ea         Hnd held AA Flexion Wand x20            Sh' flex arom x20 Supine 20 Supine  x20 Supine  x20  Supine  x20 supine wand 1# x15 supine wand 1 5# x15     Sh' abd arom x20 SL 20 SL 20 SL 20  SL 20  SL 20     Sh' ER  SL 20 SL 20 SL 20  SL 20  SL 20     Bent over row 1# x20 1#x20 1# 20 2# x20  2# x20  3# x20     TB Rows    YTB x10         TB Ext    YTB  x10         S/L sh' ER, ABD, Horz ABD      x10 ea  x10 ea     S/L sh' flex     x10   x10     Supine flyes       x15 x15                                                         Ther Activity                                       Gait Training                                       Modalities             CP             MHP

## 2022-11-10 ENCOUNTER — OFFICE VISIT (OUTPATIENT)
Dept: PHYSICAL THERAPY | Facility: CLINIC | Age: 72
End: 2022-11-10

## 2022-11-10 DIAGNOSIS — M25.512 CHRONIC LEFT SHOULDER PAIN: Primary | ICD-10-CM

## 2022-11-10 DIAGNOSIS — S42.202A CLOSED TRAUMATIC DISPLACED FRACTURE OF PROXIMAL END OF LEFT HUMERUS, INITIAL ENCOUNTER: ICD-10-CM

## 2022-11-10 DIAGNOSIS — G89.29 CHRONIC LEFT SHOULDER PAIN: Primary | ICD-10-CM

## 2022-11-10 NOTE — PROGRESS NOTES
Daily Note     Today's date: 11/10/2022  Patient name: Kei Lawler  : 1950  MRN: [id number]  Referring provider: Courtney Magdaleno*  Dx:   Encounter Diagnosis     ICD-10-CM    1  Chronic left shoulder pain  M25 512     G89 29    2  Closed traumatic displaced fracture of proximal end of left humerus, initial encounter  S4A        Start Time: 1714          Subjective: Doing well today  Was able to reach and pull down the sun visor in the car  Continues to notice small improvements  Objective: See treatment diary below      Assessment: Session shorted due to pt time restraint  Pt continues to improve Passive and Active ROM  Tolerated treatment well  Patient demonstrated fatigue post treatment, exhibited good technique with therapeutic exercises and would benefit from continued PT      Plan: Continue per plan of care  Progress treatment as tolerated  Precautions: h/o L proximal humeral head fx  DOI: 22  Protocol: d/c sling on 22; begin AAROM, AROM on 22, begin resistive exercise on 22  Other factors: conservative tx, will consider reverse TSA PRN  EPOC: 22      HEP:  Access Code: [email or web address]  URL: [email or web address]  Date: 2022  Prepared by: Kasey Ray    Exercises  · Shoulder Flexion Overhead with Dowel - 20 reps  · Seated Shoulder Flexion AAROM with Pulley Behind - 5 min hold  · Circular Shoulder Pendulum with Table Support - 1 min hold  · Standing Bent Over Single Arm Scapular Row with Table Support - 20 reps  · Standing Shoulder Flexion Full Range - 20 reps  · Standing Shoulder Abduction Full Range - 20 reps  · Bicep curls supinated - 20 reps          Manuals 9/1 9/13 9/20 9/27 9/29 10/20 10/27 11/3 11/10    RE     DANNI        R elbow PROM   WE WE  WE       R sh' PROM KK  WE WE [address] WE [address] WE WE    GHJ AISHWARYA jacobo     [address]        RUE, sh' STM   WE    DANNI WE WE    RC iso flex/ext, ER/IR, abd/add   WE Neuro Re-Ed             Supine arm bar rot     1# to fatigue x2 1# x5  1# x10 1# x10                                                                                  Ther Ex             Pulleys 3'/2' 2'/2'  2'/2'   3'      UBE  2'/2' 3'/3' 4'/4' 3'/3' 3'/3' 5'/5' 6'/6' 6'/6'    Pendulums 2#  1 min            Cane Ext and FIR  x20 ea x20 x20 ea         Hnd held AA Flexion Wand x20            Sh' flex arom x20 Supine 20 Supine  x20 Supine  x20  Supine  x20 supine wand 1# x15 supine wand 1 5# x15     Sh' abd arom x20 SL 20 SL 20 SL 20  SL 20  SL 20     Sh' ER  SL 20 SL 20 SL 20  SL 20  SL 20     Bent over row 1# x20 1#x20 1# 20 2# x20  2# x20  3# x20     TB Rows    YTB x10         TB Ext    YTB  x10         S/L sh' ER, ABD, Horz ABD      x10 ea  x10 ea     S/L sh' flex     x10   x10     Supine flyes       x15 x15 x15                                                        Ther Activity                                       Gait Training                                       Modalities             CP             MHP

## 2022-11-17 ENCOUNTER — APPOINTMENT (OUTPATIENT)
Dept: PHYSICAL THERAPY | Facility: CLINIC | Age: 72
End: 2022-11-17

## 2022-11-21 ENCOUNTER — EVALUATION (OUTPATIENT)
Dept: PHYSICAL THERAPY | Facility: CLINIC | Age: 72
End: 2022-11-21

## 2022-11-21 DIAGNOSIS — S42.202A CLOSED TRAUMATIC DISPLACED FRACTURE OF PROXIMAL END OF LEFT HUMERUS, INITIAL ENCOUNTER: ICD-10-CM

## 2022-11-21 DIAGNOSIS — M25.512 CHRONIC LEFT SHOULDER PAIN: Primary | ICD-10-CM

## 2022-11-21 DIAGNOSIS — G89.29 CHRONIC LEFT SHOULDER PAIN: Primary | ICD-10-CM

## 2022-11-21 NOTE — PROGRESS NOTES
PT Re-Evaluation     Today's date: 2022  Patient name: Carrie Orr  : 1950  MRN: [id number]  Referring provider: Myles Carnes  Dx:   Encounter Diagnosis     ICD-10-CM    1  Chronic left shoulder pain  M25 512     G89 29       2  Closed traumatic displaced fracture of proximal end of left humerus, initial encounter  S4           Start Time: 63          Assessment  Assessment details: Carrie Orr is a 73 y o  female presenting to outpatient physical therapy at [facility] with complaints of L shoulder pain s/p L proximal humerus fx on 22   They present with decreased range of motion, decreased strength, limited flexibility, poor postural awareness, poor body mechanics, poor balance, decreased tolerance to activity and decreased functional mobility due to Chronic left shoulder pain  (primary encounter diagnosis)  Closed traumatic displaced fracture of proximal end of left humerus, initial encounter  Manuel Carvajal would benefit from skilled physical therapy to address noted impairments in order to allow for full functional return to work and ADL-related activities  Thank you for the referral!    RE: 22  Carrie Orr has attended physical therapy for 15 visits  In this time, they have made significant improvements in symptoms and function, as noted by subjective report, improved balance, ROM, strength, flexibility and activity tolerance  They have made positive goal progress with FOTO score improvement from 18 at initial evaluation to 52 currently  They do continue to have impairments in pain, ROM, strength, balance, flexibility and activity tolerance  Recommend continued skilled PT in order to maximize function  RE: 22  Carrie Orr has attended physical therapy for 20 visits   In this time, they have made significant improvements in symptoms and function, as noted by subjective report, improved balance, ROM, strength, flexibility and activity tolerance  They have made positive goal progress with FOTO score improvement from 49 at previous evaluation to 46 currently  They do continue to have impairments in pain, ROM, strength, balance, flexibility and activity tolerance  Recommend continued skilled PT in order to maximize function  Impairments: abnormal muscle firing, abnormal or restricted ROM, activity intolerance, impaired balance, impaired physical strength, lacks appropriate home exercise program, pain with function and poor body mechanics  Barriers to therapy: n/a  Understanding of Dx/Px/POC: excellent  Goals  ST   Independent with HEP in 2 weeks - met  2  Decrease pain by 50% in 3 wks - met  3   Increase shoulder elevation AROM to 30 degrees in 3 weeks - met    LT  Achieve FOTO score of 52/100 in 6 weeks  - met  2   Able to d/c the sling completely in 6 weeks - met  3  Strength = 4-/5 R shoulder all planes in 6 weeks - not met  4  Able to reach 75 deg shoulder elevation AROM in 6 weeks  - met    5  Able to achieve IR behind the back to level of L4 in 6 weeks  6  Able to achieve 90 deg AROM shoulder flexion in 6 weeks  - met      Plan  Plan details: D/c in 2 weeks  Patient would benefit from: skilled physical therapy  Planned modality interventions: cryotherapy, electrical stimulation/Russian stimulation and thermotherapy: hydrocollator packs  Planned therapy interventions: abdominal trunk stabilization, manual therapy, neuromuscular re-education, therapeutic activities, therapeutic exercise, body mechanics training and home exercise program  Frequency: 1x week  Plan of Care beginning date: 2022  Plan of Care expiration date: 2022  Treatment plan discussed with: patient        Subjective Evaluation    History of Present Illness  Mechanism of injury: Pt c/o L shoulder pain s/p L proximal humeral head fx due to a fall  DOI 22 (3 5 weeks ago)  Treated conservatively with sling   Plan is to continue with conservative tx and will consider reverse total shoulder replacement if lack of positive results  During f/u with orthopedics on 6/28/22, pt was advised to discontinue sling use after 7/5 and then begin AAROM and AROM exercises  She is allowed to begin resistive exercise on 8/9/22  She reports compliance with her restrictions, though continues to wear the sling due to not feeling ready  She does not sleep with the sling on  Pain levels are 3/10 on avg  She takes tylenol around the clock  Positive for tingling in the L hand  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  She does not work  In her free time she enjoyed gardening, dance-walking exercising for 40 mins to an hour every other day and housework  Her driving is limited because of the tumor and she is not currently driving  She currently is quite restricted in her ADLs and hobbies secondary to the arm pain  She is becoming more independent with bathing, dressing, though requires help with the bra and shirt  She does not cook  She is having increased dizziness/unsteadiness while walking and standing, general body weakness (worse in the AM)  She continues to have flashbacks of the injury and expresses anxieties surrounding current state  RE: 9/29/22  Pt reports making significant progress since starting therapy  She has been able to d/c her sling, have greater shoulder range of motion, greater shoulder strength, more confidence with walking and doing ADLs independently  She is compliant with her HEP and all mobility work  Pain is very rare  She is still unable to lie on the L side secondary to discomfort and inability to push off with the L arm  She would like to be able to lift her arm up to the side better  She'd also like to be able to lift her arm behind her back for washing and dressing  She would like to continue therapy      Most recent shoulder xray from 9/15 shows "Stable alignment of healing displaced comminuted left humeral head and neck fracture"  She was discharged from orthopedics and will f/u PRN  RE: 11/21/22  Pt had a 2 week stent of time recently where she was sick with COVID and unable to do much exercises for her arm  She felt as though she regressed a bit, but has been diligent about doing her ROM exercises and is again making progress weekly  She is pleased with her ability to use the arm and is feeling more comfortable walking without the cane for support as her balance is improving as well        Patient Goals  Patient goals for therapy: decreased edema, decreased pain, improved balance, increased motion, return to work, return to [employer] activities, independence with ADLs/IADLs and increased strength          Objective     Postural Observations    Additional Postural Observation Details  L arm at rest unguarded, she talks with the use of her L hand for gesturing without hesitation    Tenderness     Additional Tenderness Details  TTP L bicep, posterior shoulder    (+) sulcus sign  (+) abd lag    Cervical/Thoracic Screen   Cervical range of motion within normal limits    Active Range of Motion   Left Shoulder   Flexion: 85 degrees   Extension: 21 degrees   Abduction: 25 degrees   External rotation 0°: 20 degrees   External rotation BTH: C2   Internal rotation 0°: 0 degrees   Internal rotation BTB: sacrum     Additional Active Range of Motion Details  Wrist arom WFL all directions  Hand arom WFL all directions  Elbow flex WFL  Elbow ext WFL    Passive Range of Motion   Left Shoulder   Flexion: 122 degrees   Abduction: 70 degrees   External rotation 45°: 22 degrees   Internal rotation 45°: 55 degrees     Strength/Myotome Testing     Left Shoulder     Planes of Motion   Flexion: 3   Extension: 3+   Abduction: 0   Adduction: 3+   External rotation at 0°: 3-   Internal rotation at 0°: 3     Isolated Muscles   Biceps: 3+   Triceps: 3+   Upper trapezius: 4+     Additional Strength Details  Unable to test             Precautions: h/o L proximal humeral head fx  DOI: 6/19/22  Protocol: d/c sling on 7/5/22; begin AAROM, AROM on 7/5/22, begin resistive exercise on 8/9/22  Other factors: conservative tx, will consider reverse TSA PRN  EPOC: 8/26/22      HEP:  Access Code: [email or web address]  URL: [email or web address]  Date: 08/16/2022  Prepared by: Lana López    Exercises  · Shoulder Flexion Overhead with Dowel - 20 reps  · Seated Shoulder Flexion AAROM with Pulley Behind - 5 min hold  · Circular Shoulder Pendulum with Table Support - 1 min hold  · Standing Bent Over Single Arm Scapular Row with Table Support - 20 reps  · Standing Shoulder Flexion Full Range - 20 reps  · Standing Shoulder Abduction Full Range - 20 reps  · Bicep curls supinated - 20 reps          Manuals 9/1 9/13 9/20 9/27 9/29 10/20 10/27 11/3 11/10 11/21   RE     [facility]   R elbow PROM   WE WE  WE       R sh' PROM KK  WE WE [address] WE DANNI WE WE    GHJ PA glides     DANNI        RUE, sh' STM   WE    DANNI WE WE    RC iso flex/ext, ER/IR, abd/add   WE                                                 Neuro Re-Ed             Supine arm bar rot     1# to fatigue x2 1# x5  1# x10 1# x10                                                                                  Ther Ex             Pulleys 3'/2' 2'/2'  2'/2'   3'      UBE  2'/2' 3'/3' 4'/4' 3'/3' 3'/3' 5'/5' 6'/6' 6'/6' 6'/6'   Pendulums 2#  1 min            Cane Ext and FIR  x20 ea x20 x20 ea         Hnd held AA Flexion Wand x20            Sh' flex arom x20 Supine 20 Supine  x20 Supine  x20  Supine  x20 supine wand 1# x15 supine wand 1 5# x15  2x10   Sh' abd arom x20 SL 20 SL 20 SL 20  SL 20  SL 20  2x10   IR btb arom          2x10   Sh' ER  SL 20 SL 20 SL 20  SL 20  SL 20     Bent over row 1# x20 1#x20 1# 20 2# x20  2# x20  3# x20     TB Rows    YTB x10         TB Ext    YTB  x10         S/L sh' ER, ABD, Horz ABD      x10 ea  x10 ea     S/L sh' flex     x10   x10     Supine flyes x15 x15 x15                                                        Ther Activity                                       Gait Training                                       Modalities             CP             P

## 2022-12-01 ENCOUNTER — OFFICE VISIT (OUTPATIENT)
Dept: PHYSICAL THERAPY | Facility: CLINIC | Age: 72
End: 2022-12-01

## 2022-12-01 DIAGNOSIS — S42.202A CLOSED TRAUMATIC DISPLACED FRACTURE OF PROXIMAL END OF LEFT HUMERUS, INITIAL ENCOUNTER: ICD-10-CM

## 2022-12-01 DIAGNOSIS — M25.512 CHRONIC LEFT SHOULDER PAIN: Primary | ICD-10-CM

## 2022-12-01 DIAGNOSIS — G89.29 CHRONIC LEFT SHOULDER PAIN: Primary | ICD-10-CM

## 2022-12-01 NOTE — PROGRESS NOTES
Daily Note / Discharge    Today's date: 2022  Patient name: Fermin Woo  : 1950  MRN: [id number]  Referring provider: Karlee Mccain  Dx:   Encounter Diagnosis     ICD-10-CM    1  Chronic left shoulder pain  M25 512     G89 29       2  Closed traumatic displaced fracture of proximal end of left humerus, initial encounter  S42                       Subjective: Pt feeling confident in her progress made in therapy and feels comfortable transitioning to independent HEP today  Objective: See treatment diary below      Assessment: Pt  has continued to progress and is ready to be d/c to Home Exercise Program today  Pt  now reports no pain at rest and minimal to no pain with activity  Pt  now improved to having little difficulty with most ADL's due to condition being seen at PT for  ROM has improved significantly with only limitation remaining in IR/ER  Strength weak in ABD  Discussed TE's given on Home Exercise Program, which pt  showed competency in   Pt  Has met most impairment and functional goals      Plan: d/c to independent HEP     Precautions: h/o L proximal humeral head fx  DOI: 22  Protocol: d/c sling on 22; begin AAROM, AROM on 22, begin resistive exercise on 22  Other factors: conservative tx, will consider reverse TSA PRN  EPOC: 22      HEP:  Access Code: [email or web address]  URL: [email or web address]  Date: 2022  Prepared by: Musa Peterson    Exercises  · Shoulder Flexion Overhead with Dowel - 20 reps  · Seated Shoulder Flexion AAROM with Pulley Behind - 5 min hold  · Circular Shoulder Pendulum with Table Support - 1 min hold  · Standing Bent Over Single Arm Scapular Row with Table Support - 20 reps  · Standing Shoulder Flexion Full Range - 20 reps  · Standing Shoulder Abduction Full Range - 20 reps  · Bicep curls supinated - 20 reps          Manuals 9/1 9/13 9/20 9/27 9/29 10/20 10/27 11/3 11/10 11/21 12/1   RE     [address] FOTO          DANNI    R elbow PROM   WE WE  WE        R sh' PROM KK  WE WE [address] WE [address] WE WE  WE   GHJ AISHWARYA jacobo     DANNI         RUE, sh' STM   WE    DANNI WE WE  WE   RC iso flex/ext, ER/IR, abd/add   WE                                                     Neuro Re-Ed              Supine arm bar rot     1# to fatigue x2 1# x5  1# x10 1# x10                                                                                         Ther Ex              Pulleys 3'/2' 2'/2'  2'/2'   3'       UBE  2'/2' 3'/3' 4'/4' 3'/3' 3'/3' 5'/5' 6'/6' 6'/6' 6'/6' 6'/6'   Pendulums 2#  1 min             Cane Ext and FIR  x20 ea x20 x20 ea          Hnd held AA Flexion Wand x20             Sh' flex arom x20 Supine 20 Supine  x20 Supine  x20  Supine  x20 supine wand 1# x15 supine wand 1 5# x15  2x10    Sh' abd arom x20 SL 20 SL 20 SL 20  SL 20  SL 20  2x10    IR btb arom          2x10    Sh' ER  SL 20 SL 20 SL 20  SL 20  SL 20      Bent over row 1# x20 1#x20 1# 20 2# x20  2# x20  3# x20      TB Rows    YTB x10          TB Ext    YTB  x10          S/L sh' ER, ABD, Horz ABD      x10 ea  x10 ea      S/L sh' flex     x10   x10      Supine flyes       x15 x15 x15                                                             Ther Activity                                          Gait Training                                          Modalities              CP              MHP

## 2024-01-01 NOTE — ANESTHESIA PREPROCEDURE EVALUATION
Procedure:  EGD    Relevant Problems   GI/HEPATIC   (+) Gastroesophageal reflux disease      NEURO/PSYCH   (+) History of pulmonary embolism      C/f achalasia    NPO since MN (aside from sips with meds this morning around 9)    CT C/A/P 6/19/2022  IMPRESSION:  1  Displaced, comminuted proximal left humeral fracture  Recommend follow-up orthopedic surgery consultation      2  Distended, fluid-filled stomach with reflux into a dilated, abnormal appearing esophagus, up to the level of the thoracic inlet  Correlate for gastroparesis and achalasia  Patient is at increased risk for aspiration  Recommend follow-up GI   consultation      3  No traumatic solid or visceral organ injury      4   Mildly complex exophytic cyst upper pole left kidney  Follow-up renal ultrasound is recommended  Physical Exam    Airway    Mallampati score: II  TM Distance: >3 FB  Neck ROM: full     Dental   No notable dental hx     Cardiovascular  Rhythm: regular, Rate: normal,     Pulmonary  Comment: Speaking in full sentences, normal work of breathing, not tachypneic, Pulmonary exam normal     Other Findings        Anesthesia Plan  ASA Score- 2     Anesthesia Type- general with ASA Monitors  Additional Monitors:   Airway Plan: ETT  Plan Factors-Exercise tolerance (METS): >4 METS  Chart reviewed  Patient is not a current smoker  Obstructive sleep apnea risk education given perioperatively  Induction- intravenous  Postoperative Plan-     Informed Consent- Anesthetic plan and risks discussed with patient  I personally reviewed this patient with the CRNA  Discussed and agreed on the Anesthesia Plan with the CRNA  Chato Bhatt
hand grasp, leg strength strong and equal bilaterally